# Patient Record
Sex: MALE | Race: WHITE | Employment: OTHER | RURAL
[De-identification: names, ages, dates, MRNs, and addresses within clinical notes are randomized per-mention and may not be internally consistent; named-entity substitution may affect disease eponyms.]

---

## 2017-02-24 ENCOUNTER — OFFICE VISIT (OUTPATIENT)
Dept: PRIMARY CARE CLINIC | Age: 22
End: 2017-02-24
Payer: COMMERCIAL

## 2017-02-24 VITALS
TEMPERATURE: 99.8 F | BODY MASS INDEX: 27.74 KG/M2 | HEART RATE: 96 BPM | RESPIRATION RATE: 20 BRPM | DIASTOLIC BLOOD PRESSURE: 68 MMHG | WEIGHT: 172.6 LBS | SYSTOLIC BLOOD PRESSURE: 116 MMHG | OXYGEN SATURATION: 98 % | HEIGHT: 66 IN

## 2017-02-24 DIAGNOSIS — R50.9 FEVER, UNSPECIFIED FEVER CAUSE: ICD-10-CM

## 2017-02-24 DIAGNOSIS — J10.1 INFLUENZA A: Primary | ICD-10-CM

## 2017-02-24 LAB
INFLUENZA A ANTIBODY: ABNORMAL
INFLUENZA B ANTIBODY: ABNORMAL

## 2017-02-24 PROCEDURE — 87804 INFLUENZA ASSAY W/OPTIC: CPT | Performed by: NURSE PRACTITIONER

## 2017-02-24 PROCEDURE — 99202 OFFICE O/P NEW SF 15 MIN: CPT | Performed by: NURSE PRACTITIONER

## 2017-02-24 RX ORDER — CETIRIZINE HYDROCHLORIDE 10 MG/1
10 TABLET ORAL DAILY
COMMUNITY
End: 2020-12-09

## 2017-02-24 RX ORDER — OSELTAMIVIR PHOSPHATE 75 MG/1
75 CAPSULE ORAL 2 TIMES DAILY
Qty: 10 CAPSULE | Refills: 0 | Status: SHIPPED | OUTPATIENT
Start: 2017-02-24 | End: 2017-03-01

## 2017-02-24 RX ORDER — M-VIT,TX,IRON,MINS/CALC/FOLIC 27MG-0.4MG
1 TABLET ORAL DAILY
COMMUNITY
End: 2018-07-06 | Stop reason: ALTCHOICE

## 2018-07-06 ENCOUNTER — OFFICE VISIT (OUTPATIENT)
Dept: PRIMARY CARE CLINIC | Age: 23
End: 2018-07-06
Payer: COMMERCIAL

## 2018-07-06 VITALS
SYSTOLIC BLOOD PRESSURE: 138 MMHG | OXYGEN SATURATION: 98 % | DIASTOLIC BLOOD PRESSURE: 78 MMHG | RESPIRATION RATE: 16 BRPM | TEMPERATURE: 98.4 F | HEART RATE: 74 BPM | BODY MASS INDEX: 26.42 KG/M2 | WEIGHT: 164.4 LBS | HEIGHT: 66 IN

## 2018-07-06 DIAGNOSIS — R30.0 DYSURIA: Primary | ICD-10-CM

## 2018-07-06 LAB
BILIRUBIN, POC: NORMAL
BLOOD URINE, POC: NORMAL
CLARITY, POC: CLEAR
COLOR, POC: YELLOW
GLUCOSE URINE, POC: NORMAL
KETONES, POC: NORMAL
LEUKOCYTE EST, POC: NORMAL
NITRITE, POC: NORMAL
PH, POC: 5
PROTEIN, POC: NORMAL
SPECIFIC GRAVITY, POC: 1
UROBILINOGEN, POC: 0.2

## 2018-07-06 PROCEDURE — 99213 OFFICE O/P EST LOW 20 MIN: CPT | Performed by: NURSE PRACTITIONER

## 2018-07-06 PROCEDURE — 81002 URINALYSIS NONAUTO W/O SCOPE: CPT | Performed by: NURSE PRACTITIONER

## 2018-07-06 PROCEDURE — 96372 THER/PROPH/DIAG INJ SC/IM: CPT | Performed by: NURSE PRACTITIONER

## 2018-07-06 RX ORDER — CEFTRIAXONE 1 G/1
1 INJECTION, POWDER, FOR SOLUTION INTRAMUSCULAR; INTRAVENOUS ONCE
Status: COMPLETED | OUTPATIENT
Start: 2018-07-06 | End: 2018-07-06

## 2018-07-06 RX ORDER — DOXYCYCLINE HYCLATE 100 MG/1
100 CAPSULE ORAL 2 TIMES DAILY
Qty: 20 CAPSULE | Refills: 0 | Status: SHIPPED | OUTPATIENT
Start: 2018-07-06 | End: 2018-07-16

## 2018-07-06 RX ADMIN — CEFTRIAXONE 1 G: 1 INJECTION, POWDER, FOR SOLUTION INTRAMUSCULAR; INTRAVENOUS at 14:17

## 2018-07-06 ASSESSMENT — ENCOUNTER SYMPTOMS
ANAL BLEEDING: 0
NAUSEA: 0
BLOOD IN STOOL: 0
CONSTIPATION: 0
RESPIRATORY NEGATIVE: 1
EYES NEGATIVE: 1
ABDOMINAL DISTENTION: 0

## 2018-07-06 NOTE — PROGRESS NOTES
Subjective:      Patient ID: James Pena is a 26 y.o. male. Was seeing a girl  Broke up for a awhile  Then got back together . Symptoms began after that . Still having  Dysuria . Was check at [facility] but not treated. Had neg lab work . Dysuria    The current episode started 1 to 4 weeks ago. The problem has been unchanged. The pain is at a severity of 3/10. Pertinent negatives include no chills, flank pain or nausea. Review of Systems   Constitutional: Positive for activity change. Negative for chills and fatigue. HENT: Negative. Eyes: Negative. Respiratory: Negative. Gastrointestinal: Negative for abdominal distention, anal bleeding, blood in stool, constipation and nausea. Genitourinary: Positive for discharge (one time ), dysuria and penile pain. Negative for flank pain. Objective:   Physical Exam   Constitutional: He appears well-developed and well-nourished. Abdominal: There is no tenderness. Genitourinary: Penis normal. No penile tenderness. Musculoskeletal: Normal range of motion. Nursing note and vitals reviewed. Assessment:   Dysuria ,  Possible exposure to STD     Plan:    Rocephin  im  Then doxy for 10 days to follow up if not resolved.

## 2018-09-01 ENCOUNTER — OFFICE VISIT (OUTPATIENT)
Dept: PRIMARY CARE CLINIC | Age: 23
End: 2018-09-01
Payer: COMMERCIAL

## 2018-09-01 VITALS
OXYGEN SATURATION: 99 % | BODY MASS INDEX: 28.16 KG/M2 | SYSTOLIC BLOOD PRESSURE: 124 MMHG | DIASTOLIC BLOOD PRESSURE: 72 MMHG | HEART RATE: 65 BPM | TEMPERATURE: 98.2 F | HEIGHT: 65 IN | WEIGHT: 169 LBS

## 2018-09-01 DIAGNOSIS — J01.80 ACUTE NON-RECURRENT SINUSITIS OF OTHER SINUS: Primary | ICD-10-CM

## 2018-09-01 DIAGNOSIS — H61.23 IMPACTED CERUMEN OF BOTH EARS: ICD-10-CM

## 2018-09-01 PROCEDURE — 99213 OFFICE O/P EST LOW 20 MIN: CPT | Performed by: NURSE PRACTITIONER

## 2018-09-01 PROCEDURE — 69209 REMOVE IMPACTED EAR WAX UNI: CPT | Performed by: NURSE PRACTITIONER

## 2018-09-01 RX ORDER — CEFDINIR 300 MG/1
300 CAPSULE ORAL 2 TIMES DAILY
Qty: 20 CAPSULE | Refills: 0 | Status: SHIPPED | OUTPATIENT
Start: 2018-09-01 | End: 2018-09-11

## 2018-09-01 RX ORDER — METHYLPREDNISOLONE 4 MG/1
4 TABLET ORAL SEE ADMIN INSTRUCTIONS
Qty: 1 KIT | Refills: 0 | Status: SHIPPED | OUTPATIENT
Start: 2018-09-01 | End: 2018-09-07

## 2018-09-01 ASSESSMENT — ENCOUNTER SYMPTOMS
WHEEZING: 1
COUGH: 1

## 2019-05-14 ENCOUNTER — HOSPITAL ENCOUNTER (OUTPATIENT)
Dept: ULTRASOUND IMAGING | Facility: HOSPITAL | Age: 24
Discharge: HOME | End: 2019-05-14
Admitting: NURSE PRACTITIONER

## 2019-05-14 ENCOUNTER — TRANSCRIBE ORDERS (OUTPATIENT)
Dept: ULTRASOUND IMAGING | Facility: HOSPITAL | Age: 24
End: 2019-05-14

## 2019-05-14 DIAGNOSIS — N63.0 UNSPECIFIED LUMP IN UNSPECIFIED BREAST: Primary | ICD-10-CM

## 2019-05-14 DIAGNOSIS — N63.0 UNSPECIFIED LUMP IN UNSPECIFIED BREAST: ICD-10-CM

## 2019-05-14 PROCEDURE — 76642 ULTRASOUND BREAST LIMITED: CPT

## 2019-09-15 ENCOUNTER — HOSPITAL ENCOUNTER (EMERGENCY)
Facility: HOSPITAL | Age: 24
Discharge: HOME | End: 2019-09-15
Attending: EMERGENCY MEDICINE | Admitting: EMERGENCY MEDICINE

## 2019-09-15 VITALS
HEART RATE: 64 BPM | OXYGEN SATURATION: 96 % | TEMPERATURE: 97.6 F | WEIGHT: 180 LBS | SYSTOLIC BLOOD PRESSURE: 125 MMHG | DIASTOLIC BLOOD PRESSURE: 75 MMHG | BODY MASS INDEX: 29.99 KG/M2 | RESPIRATION RATE: 18 BRPM | HEIGHT: 65 IN

## 2019-09-15 DIAGNOSIS — IMO0001 NEEDLESTICK INJURY OF FINGER, INITIAL ENCOUNTER: Primary | ICD-10-CM

## 2019-09-15 LAB
HAV IGM SERPL QL IA: NORMAL
HBV CORE IGM SERPL QL IA: NORMAL
HBV SURFACE AG SERPL QL IA: NORMAL
HCV AB SER DONR QL: NORMAL
HIV1 P24 AG SER QL: NORMAL
HIV1+2 AB SER QL: NORMAL
RPR SER QL: NORMAL

## 2019-09-15 PROCEDURE — 80074 ACUTE HEPATITIS PANEL: CPT | Performed by: EMERGENCY MEDICINE

## 2019-09-15 PROCEDURE — G0432 EIA HIV-1/HIV-2 SCREEN: HCPCS | Performed by: EMERGENCY MEDICINE

## 2019-09-15 PROCEDURE — 87899 AGENT NOS ASSAY W/OPTIC: CPT | Performed by: EMERGENCY MEDICINE

## 2019-09-15 PROCEDURE — 99283 EMERGENCY DEPT VISIT LOW MDM: CPT

## 2019-09-15 PROCEDURE — 86592 SYPHILIS TEST NON-TREP QUAL: CPT | Performed by: EMERGENCY MEDICINE

## 2019-09-15 NOTE — ED PROVIDER NOTES
Subjective   24-year-old male presenting with needlestick.  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 There was ink on the needle and was told by the inmate that the needle had been used. Unsure of who the needle was used on.  He has no complaints.  He has no medical history.  His immunizations are up-to-date.            Review of Systems   Constitutional: Negative.    HENT: Negative.    Eyes: Negative.    Respiratory: Negative.    Cardiovascular: Negative.    Gastrointestinal: Negative.    Genitourinary: Negative.    Musculoskeletal: Negative.    Skin: Positive for wound.   Neurological: Negative.    Psychiatric/Behavioral: Negative.        History reviewed. No pertinent past medical history.    No Known Allergies    History reviewed. No pertinent surgical history.    History reviewed. No pertinent family history.    Social History     Socioeconomic History   • Marital status: Single     Spouse name: Not on file   • Number of children: Not on file   • Years of education: Not on file   • Highest education level: Not on file   Substance and Sexual Activity   • Alcohol use: No     Frequency: Never   • Drug use: No           Objective   Physical Exam   Constitutional: He is oriented to person, place, and time. He appears well-developed and well-nourished. No distress.   HENT:   Head: Normocephalic and atraumatic.   Right Ear: External ear normal.   Left Ear: External ear normal.   Nose: Nose normal.   Mouth/Throat: Oropharynx is clear and moist.   Eyes: Conjunctivae and EOM are normal. Pupils are equal, round, and reactive to light.   Neck: Normal range of motion. Neck supple.   Cardiovascular: Normal rate, regular rhythm, normal heart sounds and intact distal pulses.   Pulmonary/Chest: Effort normal and breath sounds normal. No respiratory distress.   Abdominal: Soft. Bowel sounds are  normal. He exhibits no distension. There is no tenderness. There is no rebound and no guarding.   Musculoskeletal: Normal range of motion. He exhibits no edema, tenderness or deformity.   Neurological: He is alert and oriented to person, place, and time.   Skin: Skin is warm and dry. No rash noted.   Tiny puncture wound to left volar index finger   Psychiatric: He has a normal mood and affect. His behavior is normal.   Nursing note and vitals reviewed.      Procedures           ED Course              MDM  Number of Diagnoses or Management Options  Needlestick injury of finger, initial encounter:   Diagnosis management comments: 24 year old male with needle stick. Well developed, well nourished young man in no distress with exam as above. I think this is likely a very low risk injury for transmission of any infectious disease. We did discuss post exposure prophylaxis and will defer at this time. Will draw labs and have him follow up with occupational health.     DDX: needle stick       Amount and/or Complexity of Data Reviewed  Clinical lab tests: reviewed      Final diagnoses:   Needlestick injury of finger, initial encounter              Andrew Dunne MD  09/15/19 9104

## 2019-09-15 NOTE — DISCHARGE INSTRUCTIONS
Cleanse wound with warm soapy water, can use antibacterial ointment twice daily for 4-5 days. Monitor for signs of infection. Follow up with occupational health for further work up and evaluation

## 2020-02-28 ENCOUNTER — HOSPITAL ENCOUNTER (EMERGENCY)
Facility: HOSPITAL | Age: 25
Discharge: HOME | End: 2020-02-28
Attending: EMERGENCY MEDICINE | Admitting: EMERGENCY MEDICINE

## 2020-02-28 VITALS
RESPIRATION RATE: 20 BRPM | HEART RATE: 78 BPM | TEMPERATURE: 98.4 F | BODY MASS INDEX: 34.05 KG/M2 | OXYGEN SATURATION: 96 % | SYSTOLIC BLOOD PRESSURE: 138 MMHG | WEIGHT: 204.4 LBS | DIASTOLIC BLOOD PRESSURE: 92 MMHG | HEIGHT: 65 IN

## 2020-02-28 DIAGNOSIS — F10.29 ALCOHOL DEPENDENCE WITH UNSPECIFIED ALCOHOL-INDUCED DISORDER (HCC): Primary | ICD-10-CM

## 2020-02-28 PROCEDURE — 99283 EMERGENCY DEPT VISIT LOW MDM: CPT

## 2020-02-28 RX ORDER — ONDANSETRON 4 MG/1
4 TABLET, ORALLY DISINTEGRATING ORAL EVERY 8 HOURS PRN
Qty: 12 TABLET | Refills: 0 | Status: SHIPPED | OUTPATIENT
Start: 2020-02-28 | End: 2020-09-15

## 2020-02-28 RX ORDER — SUCRALFATE 1 G/1
1 TABLET ORAL 4 TIMES DAILY
Qty: 40 TABLET | Refills: 0 | Status: SHIPPED | OUTPATIENT
Start: 2020-02-28 | End: 2020-09-15

## 2020-02-28 RX ORDER — PANTOPRAZOLE SODIUM 20 MG/1
20 TABLET, DELAYED RELEASE ORAL DAILY
Qty: 30 TABLET | Refills: 0 | Status: SHIPPED | OUTPATIENT
Start: 2020-02-28 | End: 2020-09-15

## 2020-09-15 ENCOUNTER — OFFICE VISIT (OUTPATIENT)
Dept: NEUROLOGY | Facility: CLINIC | Age: 25
End: 2020-09-15

## 2020-09-15 VITALS
HEIGHT: 65 IN | TEMPERATURE: 97.8 F | WEIGHT: 201 LBS | BODY MASS INDEX: 33.49 KG/M2 | SYSTOLIC BLOOD PRESSURE: 122 MMHG | DIASTOLIC BLOOD PRESSURE: 80 MMHG | HEART RATE: 116 BPM | OXYGEN SATURATION: 98 %

## 2020-09-15 DIAGNOSIS — R06.83 SNORING: Primary | ICD-10-CM

## 2020-09-15 PROCEDURE — 99203 OFFICE O/P NEW LOW 30 MIN: CPT | Performed by: NURSE PRACTITIONER

## 2020-09-15 NOTE — PROGRESS NOTES
New Sleep Patient Office Visit      Patient Name: Tristan Batista  : 1995   MRN: [id number]     Referring Physician: No ref. provider found    Chief Complaint:    Chief Complaint   Patient presents with   • Consult     Patient in office to establish care of primitivo.patient c/o of snoring,gasping for air.         History of Present Illness: Tristan Batista is a 25 y.o. male who is here today to establish care with Sleep Medicine.  Sleep questionnaire reviewed.  He says he is able to fall asleep immediately, he wakes up 2-3 times during the night, he has feelings of not being able to breathe well during the night, he sleeps an average of 5 to 7 hours per night, experiences disturbed her restless sleep, he works night shift, he snores, he has awakened at night gasping for breath, he has been told he stops breathing when sleeping, he experiences daytime sleepiness.  Additional risk factors-BMI 33.    Stinnett Score: 10    Subjective      Review of Systems:   Review of Systems   Constitutional: Positive for fatigue. Negative for chills, fever, unexpected weight gain and unexpected weight loss.   HENT: Negative for hearing loss, sore throat, swollen glands, tinnitus and trouble swallowing.    Eyes: Negative for blurred vision, double vision, photophobia and visual disturbance.   Respiratory: Positive for apnea. Negative for cough, chest tightness, shortness of breath, wheezing and stridor.    Cardiovascular: Negative for chest pain, palpitations and leg swelling.   Gastrointestinal: Negative for abdominal pain, constipation, diarrhea, nausea and GERD.   Endocrine: Negative for cold intolerance and heat intolerance.   Genitourinary: Negative for decreased libido.   Musculoskeletal: Negative for gait problem, neck pain and neck stiffness.   Skin: Negative for color change and rash.   Allergic/Immunologic: Negative for environmental allergies and food allergies.   Neurological: Negative for dizziness, syncope, facial  "asymmetry, speech difficulty, weakness, light-headedness, headache, memory problem and confusion.   Psychiatric/Behavioral: Negative for agitation, behavioral problems, decreased concentration, sleep disturbance and depressed mood. The patient is not nervous/anxious.        Past Medical History:   Past Medical History:   Diagnosis Date   • Hypertension        Past Surgical History:   Past Surgical History:   Procedure Laterality Date   • KNEE SURGERY Left        Family History:   Family History   Problem Relation Age of Onset   • Dementia Maternal Grandfather        Social History:   Social History     Socioeconomic History   • Marital status: Single     Spouse name: Not on file   • Number of children: Not on file   • Years of education: Not on file   • Highest education level: Not on file   Tobacco Use   • Smoking status: Never Smoker   • Smokeless tobacco: Current User     Types: Chew   Substance and Sexual Activity   • Alcohol use: Yes     Frequency: Never     Comment: 1/2 5th to 5th daily x 7 mos   • Drug use: No   • Sexual activity: Defer       Medications:   No current outpatient medications on file.    Allergies:   No Known Allergies    Objective     Physical Exam:  Vital Signs:   Vitals:    09/15/20 1120   BP: 122/80   Pulse: 116   Temp: 97.8 °F (36.6 °C)   SpO2: 98%   Weight: 91.2 kg (201 lb)   Height: 165.1 cm (65\")   PainSc: 0-No pain     BMI: Body mass index is 33.45 kg/m².  Neck Circumference: 14 1/4    Physical Exam  Vitals signs and nursing note reviewed.   Constitutional:       General: He is not in acute distress.     Appearance: He is well-developed. He is not diaphoretic.   HENT:      Head: Normocephalic and atraumatic.      Comments: Mallampati 3  Eyes:      Conjunctiva/sclera: Conjunctivae normal.      Pupils: Pupils are equal, round, and reactive to light.   Neck:      Musculoskeletal: Neck supple.      Thyroid: No thyroid mass or thyromegaly.      Vascular: Normal carotid pulses.      Trachea: " Trachea normal.   Cardiovascular:      Rate and Rhythm: Normal rate and regular rhythm.      Heart sounds: Normal heart sounds. No murmur. No friction rub. No gallop.    Pulmonary:      Effort: Pulmonary effort is normal. No respiratory distress.      Breath sounds: Normal breath sounds. No wheezing or rales.   Musculoskeletal: Normal range of motion.   Skin:     General: Skin is warm and dry.      Findings: No rash.   Neurological:      Mental Status: He is alert and oriented to person, place, and time.   Psychiatric:         Behavior: Behavior normal.         Thought Content: Thought content normal.         Assessment / Plan      Assessment/Plan:   Tristan was seen today for consult.    Diagnoses and all orders for this visit:    Snoring  -     Polysomnography 4 or More Parameters; Future. If insurance does not approve, can do a home sleep study.   - Printed patient education on SABI and Shift work disorder provided today.   - Advised patient to avoid driving if drowsy.     BMI 33.0-33.9,adult  -     Polysomnography 4 or More Parameters; Future  - The patient was counseled on goals and the need for weight reduction. They were directed to the NIH's website on weight management, (https://www.niddk.nih.gov/health-information/weight-management/health-tips-adults) which addresses the risks of being overweight or obese, a healthy diet, tips for losing weight, and the benefit of physical activity/exercise.         Follow Up:   Return in about 3 months (around 12/15/2020) for F/U Obstructive Sleep Apnea.    I have advised the patient the need to continue the use of CPAP.  Gold standard for treatment of sleep apnea includes weight loss, use of cpap and avoidance of alcohol.  Untreated SABI may increase the risk for development of hypertension, stroke, myocardial infarction, diabetes, cardiovascular disease, work-related issues and driving accidents. I have counseled and advised the patient to avoid driving or operating  heavy/dangerous equipment if feeling drowsy.     IVIS Barajas, FNP-C  [facility] Neurology and Sleep Medicine       Please note that portions of this note may have been completed with a voice recognition program. Efforts were made to edit the dictations, but occasionally words are mistranscribed.

## 2020-11-20 ENCOUNTER — OFFICE VISIT (OUTPATIENT)
Dept: PRIMARY CARE CLINIC | Age: 25
End: 2020-11-20
Payer: COMMERCIAL

## 2020-11-20 VITALS — OXYGEN SATURATION: 98 % | TEMPERATURE: 97.3 F | RESPIRATION RATE: 18 BRPM | HEART RATE: 69 BPM

## 2020-11-20 PROCEDURE — 99213 OFFICE O/P EST LOW 20 MIN: CPT | Performed by: NURSE PRACTITIONER

## 2020-11-20 RX ORDER — PREDNISONE 10 MG/1
10 TABLET ORAL 2 TIMES DAILY
Qty: 10 TABLET | Refills: 0 | Status: SHIPPED | OUTPATIENT
Start: 2020-11-20 | End: 2020-11-25

## 2020-11-20 RX ORDER — AMOXICILLIN 500 MG/1
500 CAPSULE ORAL 2 TIMES DAILY WITH MEALS
Qty: 20 CAPSULE | Refills: 0 | Status: SHIPPED | OUTPATIENT
Start: 2020-11-20 | End: 2020-11-30

## 2020-11-20 NOTE — PROGRESS NOTES
Pt co bilateral ear pain mostly the right one bothering him for about 2-3 weeks. Pt also co tonsil stones.

## 2020-11-20 NOTE — PROGRESS NOTES
SUBJECTIVE:    Patient ID: Mahogany Hudson is a 25 y.o.male. Chief Complaint   Patient presents with   Allegra Nearing    Other     tonsil stones         HPI:    Patient presents to clinic with complaints of right ear pain for several weeks. Associated symptoms include ear pressure, slight decreased hearing right ear, slight sore throat, few tonsil stones. Hx of ear wax impaction per patient and seasonal/environmental allergies. Denies covid exposure, fevers, chills, body aches, CP, palpitations, SOB. No relieving or worsening factors reported. No treatment regimens reported. Patient's medications, allergies, past medical, surgical, social and family histories were reviewed and updated as appropriate in electronic medical record. No outpatient medications have been marked as taking for the 11/20/20 encounter (Office Visit) with SADIE Mcclure CNP. Review of Systems   Constitutional: Negative. HENT: Positive for congestion, ear pain and sore throat. Negative for ear discharge and trouble swallowing. Eyes: Negative. Respiratory: Negative. Cardiovascular: Negative. Musculoskeletal: Negative. Allergic/Immunologic: Positive for environmental allergies. Neurological: Negative. Past Medical History:   Diagnosis Date    Allergic rhinitis      Past Surgical History:   Procedure Laterality Date    KNEE ARTHROSCOPY       Family History   Problem Relation Age of Onset    Sleep Apnea Mother     Sleep Apnea Father       Social History     Tobacco Use   Smoking Status Never Smoker   Smokeless Tobacco Current User    Types: Snuff       OBJECTIVE:   Wt Readings from Last 3 Encounters:   09/01/18 169 lb (76.7 kg)   07/06/18 164 lb 6.4 oz (74.6 kg)   02/24/17 172 lb 9.6 oz (78.3 kg) (73 %, Z= 0.62)*     * Growth percentiles are based on CDC (Boys, 2-20 Years) data.      BP Readings from Last 3 Encounters:   09/01/18 124/72   07/06/18 138/78   02/24/17 116/68       Pulse 69 above. Advised sweet oil drops 1-2 drops weekly PRN. - amoxicillin (AMOXIL) 500 MG capsule; Take 1 capsule by mouth 2 times daily (with meals) for 10 days  Dispense: 20 capsule; Refill: 0  - predniSONE (DELTASONE) 10 MG tablet; Take 1 tablet by mouth 2 times daily for 5 days  Dispense: 10 tablet;  Refill: 0        Orders Placed This Encounter   Medications    amoxicillin (AMOXIL) 500 MG capsule     Sig: Take 1 capsule by mouth 2 times daily (with meals) for 10 days     Dispense:  20 capsule     Refill:  0    predniSONE (DELTASONE) 10 MG tablet     Sig: Take 1 tablet by mouth 2 times daily for 5 days     Dispense:  10 tablet     Refill:  0

## 2020-12-02 ENCOUNTER — HOSPITAL ENCOUNTER (OUTPATIENT)
Dept: SLEEP MEDICINE | Facility: HOSPITAL | Age: 25
Discharge: HOME | End: 2020-12-02
Admitting: NURSE PRACTITIONER

## 2020-12-02 DIAGNOSIS — R06.83 SNORING: ICD-10-CM

## 2020-12-02 PROCEDURE — 95810 POLYSOM 6/> YRS 4/> PARAM: CPT | Performed by: INTERNAL MEDICINE

## 2020-12-02 PROCEDURE — 95810 POLYSOM 6/> YRS 4/> PARAM: CPT

## 2020-12-07 ASSESSMENT — ENCOUNTER SYMPTOMS
TROUBLE SWALLOWING: 0
RESPIRATORY NEGATIVE: 1
SORE THROAT: 1
EYES NEGATIVE: 1

## 2020-12-09 ENCOUNTER — OFFICE VISIT (OUTPATIENT)
Dept: PRIMARY CARE CLINIC | Age: 25
End: 2020-12-09
Payer: COMMERCIAL

## 2020-12-09 VITALS
RESPIRATION RATE: 16 BRPM | SYSTOLIC BLOOD PRESSURE: 116 MMHG | TEMPERATURE: 97.1 F | HEART RATE: 86 BPM | HEIGHT: 60 IN | BODY MASS INDEX: 38.28 KG/M2 | DIASTOLIC BLOOD PRESSURE: 80 MMHG | OXYGEN SATURATION: 98 % | WEIGHT: 195 LBS

## 2020-12-09 PROCEDURE — 99214 OFFICE O/P EST MOD 30 MIN: CPT | Performed by: NURSE PRACTITIONER

## 2020-12-09 SDOH — HEALTH STABILITY: MENTAL HEALTH: HOW OFTEN DO YOU HAVE A DRINK CONTAINING ALCOHOL?: 2-3 TIMES A WEEK

## 2020-12-09 ASSESSMENT — ENCOUNTER SYMPTOMS
CONSTIPATION: 0
ABDOMINAL PAIN: 0
VOMITING: 0
NAUSEA: 0
EYE ITCHING: 0
SORE THROAT: 0
SHORTNESS OF BREATH: 0
EYE DISCHARGE: 0
RHINORRHEA: 0
COUGH: 0
DIARRHEA: 0
EYE REDNESS: 0

## 2020-12-09 ASSESSMENT — PATIENT HEALTH QUESTIONNAIRE - PHQ9
SUM OF ALL RESPONSES TO PHQ QUESTIONS 1-9: 0
SUM OF ALL RESPONSES TO PHQ QUESTIONS 1-9: 0
1. LITTLE INTEREST OR PLEASURE IN DOING THINGS: 0
2. FEELING DOWN, DEPRESSED OR HOPELESS: 0
SUM OF ALL RESPONSES TO PHQ QUESTIONS 1-9: 0
SUM OF ALL RESPONSES TO PHQ9 QUESTIONS 1 & 2: 0

## 2020-12-09 NOTE — PATIENT INSTRUCTIONS
the week to relieve stress. Walking is a good choice. You also may want to do other activities, such as running, swimming, cycling, or playing tennis or team sports. · Do not:  ? Hold a phone between your shoulder and your jaw. ? Open your mouth all the way, like when you sing loudly or yawn. ? Clench or grind your teeth, bite your lips, or chew your fingernails. ? Clench things such as pens, pipes, or cigars between your teeth. When should you call for help? Call your doctor now or seek immediate medical care if:    · Your jaw is locked open or shut or it is hard to move your jaw. Watch closely for changes in your health, and be sure to contact your doctor if:    · Your jaw pain gets worse.     · Your face is swollen.     · You do not get better as expected. Where can you learn more? Go to https://[email or web address] Ghost [email or web address] org and sign in to your [employer] account. Enter V787 in the GLOG box to learn more about \"Temporomandibular Disorder: Care Instructions. \"     If you do not have an account, please click on the \"Sign Up Now\" link. Current as of: March 25, 2020               Content Version: 12.6  © 2821-6862 [employer]. Care instructions adapted under license by [facility] ([facility]). If you have questions about a medical condition or this instruction, always ask your healthcare professional. [place] [address] any warranty or liability for your use of this information.              Mention BuSpar and Lexapro to psychiatry to see if they feel like they may be beneficial.

## 2020-12-09 NOTE — PROGRESS NOTES
Have you seen any other physician or provider since your last visit? Yes, psych    Have you had any other diagnostic tests since your last visit? No    Have you changed or stopped any medications since your last visit? No    SUBJECTIVE:    Patient ID: Yany Agee is a 23 y.o. male. Medical History Review  Past Medical, Family, and Social History reviewed and does contribute to the patient presenting condition    Health Maintenance Due   Topic Date Due    Hepatitis C screen  1995    HPV vaccine (1 - Male 2-dose series) 03/29/2008    HIV screen  03/29/2012    DTaP/Tdap/Td vaccine (1 - Tdap) 03/29/2016    Varicella vaccine (2 of 2 - 13+ 2-dose series) 01/02/2018    Flu vaccine (1) 09/01/2020       HPI:   Chief Complaint   Patient presents with   [address]     Pt is here today to [facility]. Pt has no complaints. He just had a sleep study. He snores and stays tired a lot. He had labs a few months ago but was not told he had any deficiencies. He has some issues with TMJ disorder. He was started on Fluvoxamine, but stopped it abruptly due to side effects. He sees [employer]. He has an appointment on 12/15. He has a long history of anxiety and depression. Patient's medications, allergies, past medical, surgical, social and family histories were reviewed and updated as appropriate. Review of Systems   Constitutional: Negative for chills, fatigue and fever. HENT: Negative for congestion, ear pain, rhinorrhea and sore throat. Eyes: Negative for discharge, redness and itching. Respiratory: Negative for cough and shortness of breath. Cardiovascular: Negative for chest pain, palpitations and leg swelling. Gastrointestinal: Negative for abdominal pain, constipation, diarrhea, nausea and vomiting. Endocrine: Negative for cold intolerance and heat intolerance. Genitourinary: Negative for dysuria. Musculoskeletal: Negative for arthralgias and joint swelling. Skin: Negative for rash and wound. Neurological: Negative for weakness and headaches. Hematological: Negative for adenopathy. Psychiatric/Behavioral: Negative for dysphoric mood and sleep disturbance. The patient is not nervous/anxious. Reviewed and acurate. See MA note. OBJECTIVE:  /80 (Site: Right Upper Arm, Position: Sitting)   Pulse 86   Temp 97.1 °F (36.2 °C) (Temporal)   Resp 16   Ht 5' (1.524 m)   Wt 195 lb (88.5 kg)   SpO2 98% Comment: room air  BMI 38.08 kg/m²    Physical Exam  Constitutional:       Appearance: He is well-developed. HENT:      Head: Normocephalic and atraumatic. Right Ear: External ear normal.      Left Ear: External ear normal.   Eyes:      Conjunctiva/sclera: Conjunctivae normal.      Pupils: Pupils are equal, round, and reactive to light. Neck:      Musculoskeletal: Neck supple. Thyroid: No thyromegaly. Vascular: No JVD. Cardiovascular:      Rate and Rhythm: Normal rate and regular rhythm. Heart sounds: Normal heart sounds. No murmur. No friction rub. No gallop. Pulmonary:      Effort: Pulmonary effort is normal. No respiratory distress. Breath sounds: Normal breath sounds. Abdominal:      General: Bowel sounds are normal. There is no distension. Palpations: Abdomen is soft. Tenderness: There is no abdominal tenderness. Musculoskeletal: Normal range of motion. Lymphadenopathy:      Cervical: No cervical adenopathy. Skin:     General: Skin is warm and dry. Neurological:      Mental Status: He is alert and oriented to person, place, and time. Cranial Nerves: No cranial nerve deficit. No results found for requested labs within last 30 days. No results found for: LABA1C, LABMICR, LDLCALC    No results found for: WBC, NEUTROABS, HGB, HCT, MCV, PLT  No results found for: TSH    Prior to Visit Medications    Medication Sig Taking?  Authorizing Provider   PREDNISONE PO Take by mouth Yes Historical Provider, MD   AMOXICILLIN PO Take by mouth Yes Historical Provider, MD       ASSESSMENT:  1. Fatigue, unspecified type    2. Encounter for lipid screening for cardiovascular disease        PLAN:    Orders Placed This Encounter   Procedures    TSH without Reflex    COMPREHENSIVE METABOLIC PANEL    CBC WITH AUTO DIFFERENTIAL    VITAMIN B1    VITAMIN B12 & FOLATE    VITAMIN D 25 HYDROXY    LIPID PANEL     Patient Instructions       Patient Education        Temporomandibular Disorder: Care Instructions  Your Care Instructions     Temporomandibular (TM) disorders are a problem with the muscles and joints that connect your jaw to your skull. They cause pain when you open your mouth, chew, or yawn. You may feel this pain on one or both sides. TM disorders are often caused by tight jaw muscles. The tightness can be caused by clenching or grinding your teeth. This may happen when you have a lot of stress in your life. If you lower your stress, you may be able to stop clenching or grinding your teeth. This will help relax your jaw and reduce your pain. You may also be able to do some things at home to feel better. But if none of this works, your doctor may prescribe medicine to help relax your muscles and control the pain. Follow-up care is a key part of your treatment and safety. Be sure to make and go to all appointments, and call your doctor if you are having problems. It's also a good idea to know your test results and keep a list of the medicines you take. How can you care for yourself at home? · Put a warm, moist cloth or heating pad set on low on your jaw. Do this for 10 to 20 minutes at a time. Put a thin cloth between the heating pad and your skin. · Avoid hard or chewy foods that cause your jaws to work very hard. Examples include popcorn, jerky, tough meats, chewy breads, gum, and raw apples and carrots. · Choose softer foods that are easy to chew. These include eggs, yogurt, and soup.   · Cut your food into small pieces. Chew slowly. · If your jaw gets too painful to chew, or if it locks, you may need to puree your food for a few days or weeks. · To relax your jaw, repeat this exercise for a few minutes every morning and evening. Watch yourself in a mirror. Gently open and close your mouth. Move your jaw straight up and down. But don't do this if it makes your pain worse. · Get at least 30 minutes of exercise on most days of the week to relieve stress. Walking is a good choice. You also may want to do other activities, such as running, swimming, cycling, or playing tennis or team sports. · Do not:  ? Hold a phone between your shoulder and your jaw. ? Open your mouth all the way, like when you sing loudly or yawn. ? Clench or grind your teeth, bite your lips, or chew your fingernails. ? Clench things such as pens, pipes, or cigars between your teeth. When should you call for help? Call your doctor now or seek immediate medical care if:    · Your jaw is locked open or shut or it is hard to move your jaw. Watch closely for changes in your health, and be sure to contact your doctor if:    · Your jaw pain gets worse.     · Your face is swollen.     · You do not get better as expected. Where can you learn more? Go to https://White Rock Networkspezariaeweb.ImpactRx. org and sign in to your [employer] account. Enter V736 in the ZoomForth box to learn more about \"Temporomandibular Disorder: Care Instructions. \"     If you do not have an account, please click on the \"Sign Up Now\" link. Current as of: March 25, 2020               Content Version: 12.6  © 4502-0265 [employer]. Care instructions adapted under license by [facility] ([facility]). If you have questions about a medical condition or this instruction, always ask your healthcare professional. [address] any warranty or liability for your use of this information.              Mention BuSpar and Lexapro to psychiatry to see if they feel like they may be beneficial.           I, Faisal Rose CMA am scribing for and in the presence of SADIE Cruz on 12/30/2020 at 11:12 PM.      I, Anay NOEL, personally performed the services described in the documentation as scribed by Faisal Rose CMA, in my presence and it is both accurate and complete.

## 2020-12-15 ENCOUNTER — OFFICE VISIT (OUTPATIENT)
Dept: NEUROLOGY | Facility: CLINIC | Age: 25
End: 2020-12-15

## 2020-12-15 DIAGNOSIS — R06.83 SNORING: Primary | ICD-10-CM

## 2020-12-15 PROCEDURE — 99442 PR PHYS/QHP TELEPHONE EVALUATION 11-20 MIN: CPT | Performed by: NURSE PRACTITIONER

## 2020-12-15 NOTE — PROGRESS NOTES
New Sleep Patient Office Visit      Patient Name: Tristan Batista  : 1995   MRN: [id number]     Referring Physician: No ref. provider found    Chief Complaint:    Chief Complaint   Patient presents with   • Snoring     follow up       History of Present Illness: Tristan Batista is a 25 y.o. male who presents for a telephone visit today.  He had a sleep study on     Subjective      Review of Systems:   Review of Systems    Past Medical History:   Past Medical History:   Diagnosis Date   • Hypertension        Past Surgical History:   Past Surgical History:   Procedure Laterality Date   • KNEE SURGERY Left        Family History:   Family History   Problem Relation Age of Onset   • Dementia Maternal Grandfather        Social History:   Social History     Socioeconomic History   • Marital status: Single     Spouse name: Not on file   • Number of children: Not on file   • Years of education: Not on file   • Highest education level: Not on file   Tobacco Use   • Smoking status: Never Smoker   • Smokeless tobacco: Current User     Types: Chew   Substance and Sexual Activity   • Alcohol use: Yes     Frequency: Never     Comment: 1/2 5th to 5th daily x 7 mos   • Drug use: No   • Sexual activity: Defer       Medications:   No current outpatient medications on file.    Allergies:   No Known Allergies    Objective     Physical Exam:  Vital Signs: There were no vitals filed for this visit.  BMI: There is no height or weight on file to calculate BMI.      Physical Exam    Assessment / Plan      Assessment/Plan:   Diagnoses and all orders for this visit:    1. Snoring (Primary)    2. BMI 33.0-33.9,adult         Follow Up:   No follow-ups on file.     *This visit was performed via telephone during the COVID-19 pandemic and lasted for 11 minutes.     I have advised the patient the need to continue the use of CPAP.  Gold standard for treatment of sleep apnea includes weight loss, use of cpap and avoidance of alcohol.  Untreated  SABI may increase the risk for development of hypertension, stroke, myocardial infarction, diabetes, cardiovascular disease, work-related issues and driving accidents. I have counseled and advised the patient to avoid driving or operating heavy/dangerous equipment if feeling drowsy.     IVIS Barajas, FNP-C  [facility]       Please note that portions of this note may have been completed with a voice recognition program. Efforts were made to edit the dictations, but occasionally words are mistranscribed.

## 2021-01-14 ENCOUNTER — TELEPHONE (OUTPATIENT)
Dept: PRIMARY CARE CLINIC | Age: 26
End: 2021-01-14

## 2021-01-15 ENCOUNTER — HOSPITAL ENCOUNTER (OUTPATIENT)
Facility: HOSPITAL | Age: 26
Discharge: HOME | End: 2021-01-15
Payer: COMMERCIAL

## 2021-01-15 ENCOUNTER — OFFICE VISIT (OUTPATIENT)
Dept: PRIMARY CARE CLINIC | Age: 26
End: 2021-01-15
Payer: COMMERCIAL

## 2021-01-15 VITALS
HEART RATE: 89 BPM | DIASTOLIC BLOOD PRESSURE: 88 MMHG | TEMPERATURE: 97.4 F | HEIGHT: 66 IN | SYSTOLIC BLOOD PRESSURE: 144 MMHG | BODY MASS INDEX: 32.14 KG/M2 | WEIGHT: 200 LBS

## 2021-01-15 DIAGNOSIS — Z11.3 SCREEN FOR STD (SEXUALLY TRANSMITTED DISEASE): Primary | ICD-10-CM

## 2021-01-15 DIAGNOSIS — Z11.3 SCREEN FOR STD (SEXUALLY TRANSMITTED DISEASE): ICD-10-CM

## 2021-01-15 DIAGNOSIS — Z13.220 ENCOUNTER FOR LIPID SCREENING FOR CARDIOVASCULAR DISEASE: ICD-10-CM

## 2021-01-15 DIAGNOSIS — N39.0 URINARY TRACT INFECTION WITHOUT HEMATURIA, SITE UNSPECIFIED: ICD-10-CM

## 2021-01-15 DIAGNOSIS — R53.83 FATIGUE, UNSPECIFIED TYPE: ICD-10-CM

## 2021-01-15 DIAGNOSIS — Z13.6 ENCOUNTER FOR LIPID SCREENING FOR CARDIOVASCULAR DISEASE: ICD-10-CM

## 2021-01-15 LAB
A/G RATIO: 2.1 (ref 0.8–2)
ALBUMIN SERPL-MCNC: 5.1 G/DL (ref 3.4–4.8)
ALP BLD-CCNC: 77 U/L (ref 25–100)
ALT SERPL-CCNC: 26 U/L (ref 4–36)
ANION GAP SERPL CALCULATED.3IONS-SCNC: 11 MMOL/L (ref 3–16)
APPEARANCE FLUID: ABNORMAL
AST SERPL-CCNC: 25 U/L (ref 8–33)
BASOPHILS ABSOLUTE: 0.1 K/UL (ref 0–0.1)
BASOPHILS RELATIVE PERCENT: 1.2 %
BILIRUB SERPL-MCNC: 0.7 MG/DL (ref 0.3–1.2)
BILIRUBIN, POC: ABNORMAL
BLOOD URINE, POC: ABNORMAL
BUN BLDV-MCNC: 9 MG/DL (ref 6–20)
CALCIUM SERPL-MCNC: 10.4 MG/DL (ref 8.5–10.5)
CHLORIDE BLD-SCNC: 100 MMOL/L (ref 98–107)
CHOLESTEROL, TOTAL: 194 MG/DL (ref 0–200)
CLARITY, POC: ABNORMAL
CO2: 28 MMOL/L (ref 20–30)
COLOR, POC: YELLOW
CREAT SERPL-MCNC: 0.9 MG/DL (ref 0.4–1.2)
EOSINOPHILS ABSOLUTE: 0.7 K/UL (ref 0–0.4)
EOSINOPHILS RELATIVE PERCENT: 12.7 %
FOLATE: 10.56 NG/ML
GFR AFRICAN AMERICAN: >59
GFR NON-AFRICAN AMERICAN: >59
GLOBULIN: 2.4 G/DL
GLUCOSE BLD-MCNC: 89 MG/DL (ref 74–106)
GLUCOSE URINE, POC: ABNORMAL
HCT VFR BLD CALC: 50.2 % (ref 40–54)
HDLC SERPL-MCNC: 45 MG/DL (ref 40–60)
HEMOGLOBIN: 16.3 G/DL (ref 13–18)
IMMATURE GRANULOCYTES #: 0 K/UL
IMMATURE GRANULOCYTES %: 0.4 % (ref 0–5)
KETONES, POC: ABNORMAL
LDL CHOLESTEROL CALCULATED: 110 MG/DL
LEUKOCYTE EST, POC: ABNORMAL
LYMPHOCYTES ABSOLUTE: 1.9 K/UL (ref 1.5–4)
LYMPHOCYTES RELATIVE PERCENT: 34 %
MCH RBC QN AUTO: 30 PG (ref 27–32)
MCHC RBC AUTO-ENTMCNC: 32.5 G/DL (ref 31–35)
MCV RBC AUTO: 92.3 FL (ref 80–100)
MONOCYTES ABSOLUTE: 0.7 K/UL (ref 0.2–0.8)
MONOCYTES RELATIVE PERCENT: 12.7 %
NEUTROPHILS ABSOLUTE: 2.2 K/UL (ref 2–7.5)
NEUTROPHILS RELATIVE PERCENT: 39 %
NITRITE, POC: ABNORMAL
PDW BLD-RTO: 12.8 % (ref 11–16)
PH, POC: 8.5
PLATELET # BLD: 301 K/UL (ref 150–400)
PMV BLD AUTO: 11.1 FL (ref 6–10)
POTASSIUM SERPL-SCNC: 4.4 MMOL/L (ref 3.4–5.1)
PROTEIN, POC: ABNORMAL
RBC # BLD: 5.44 M/UL (ref 4.5–6)
SODIUM BLD-SCNC: 139 MMOL/L (ref 136–145)
SPECIFIC GRAVITY, POC: 1.01
TOTAL PROTEIN: 7.5 G/DL (ref 6.4–8.3)
TRIGL SERPL-MCNC: 195 MG/DL (ref 0–249)
TSH SERPL DL<=0.05 MIU/L-ACNC: 1.75 UIU/ML (ref 0.27–4.2)
UROBILINOGEN, POC: 0.2
VITAMIN B-12: 499 PG/ML (ref 211–911)
VITAMIN D 25-HYDROXY: 18.6 (ref 32–100)
VLDLC SERPL CALC-MCNC: 39 MG/DL
WBC # BLD: 5.7 K/UL (ref 4–11)

## 2021-01-15 PROCEDURE — 87591 N.GONORRHOEAE DNA AMP PROB: CPT

## 2021-01-15 PROCEDURE — 99212 OFFICE O/P EST SF 10 MIN: CPT | Performed by: NURSE PRACTITIONER

## 2021-01-15 PROCEDURE — 82746 ASSAY OF FOLIC ACID SERUM: CPT

## 2021-01-15 PROCEDURE — 84425 ASSAY OF VITAMIN B-1: CPT

## 2021-01-15 PROCEDURE — 81002 URINALYSIS NONAUTO W/O SCOPE: CPT | Performed by: NURSE PRACTITIONER

## 2021-01-15 PROCEDURE — 36415 COLL VENOUS BLD VENIPUNCTURE: CPT

## 2021-01-15 PROCEDURE — 85025 COMPLETE CBC W/AUTO DIFF WBC: CPT

## 2021-01-15 PROCEDURE — 80053 COMPREHEN METABOLIC PANEL: CPT

## 2021-01-15 PROCEDURE — 82306 VITAMIN D 25 HYDROXY: CPT

## 2021-01-15 PROCEDURE — 82607 VITAMIN B-12: CPT

## 2021-01-15 PROCEDURE — 80061 LIPID PANEL: CPT

## 2021-01-15 PROCEDURE — 87491 CHLMYD TRACH DNA AMP PROBE: CPT

## 2021-01-15 PROCEDURE — 84443 ASSAY THYROID STIM HORMONE: CPT

## 2021-01-15 RX ORDER — PHENAZOPYRIDINE HYDROCHLORIDE 100 MG/1
100 TABLET, FILM COATED ORAL 3 TIMES DAILY PRN
Qty: 12 TABLET | Refills: 0 | Status: SHIPPED | OUTPATIENT
Start: 2021-01-15 | End: 2021-02-02

## 2021-01-15 RX ORDER — FLUCONAZOLE 150 MG/1
150 TABLET ORAL
Qty: 2 TABLET | Refills: 0 | Status: SHIPPED | OUTPATIENT
Start: 2021-01-15 | End: 2021-01-21

## 2021-01-15 RX ORDER — DOXYCYCLINE HYCLATE 100 MG
100 TABLET ORAL 2 TIMES DAILY
Qty: 20 TABLET | Refills: 0 | Status: SHIPPED | OUTPATIENT
Start: 2021-01-15 | End: 2021-01-25

## 2021-01-15 ASSESSMENT — PATIENT HEALTH QUESTIONNAIRE - PHQ9
SUM OF ALL RESPONSES TO PHQ9 QUESTIONS 1 & 2: 0
SUM OF ALL RESPONSES TO PHQ QUESTIONS 1-9: 0
SUM OF ALL RESPONSES TO PHQ QUESTIONS 1-9: 0

## 2021-01-15 NOTE — PROGRESS NOTES
SUBJECTIVE:    Patient ID: Eve Maria is a 25 y.o.male. Chief Complaint   Patient presents with    Dysuria     x 4 days         HPI:    Pt c/o dysuria 3-4 days. Also c/o Urinary frequency, urgency. Some clear discharge from penis 1-2 times but not yellow or green. Some redness penis tip with slight pain but better today. Unsure if sexual partner had yeast infection. No change in sexual partner but still wants to be checked for STD. Denies genital sore, penis pain, scrotal pain, hematuria, flank pain. He reports this happened few years ago and took antibiotic and resolved. No treatment regimen. Patient's medications, allergies, past medical, surgical, social and family histories were reviewed and updated as appropriate in electronic medical record. No outpatient medications have been marked as taking for the 1/15/21 encounter (Appointment) with SADIE De Souza CNP. Review of Systems   Constitutional: Negative for chills, diaphoresis, fatigue and fever. HENT: Negative. Respiratory: Negative. Cardiovascular: Negative. Gastrointestinal: Negative for abdominal pain, constipation, diarrhea, nausea and vomiting. Genitourinary: Positive for discharge, dysuria, frequency and urgency. Negative for decreased urine volume, genital sores, hematuria, penile pain, penile swelling, scrotal swelling and testicular pain. Musculoskeletal: Negative. Skin: Negative.         Past Medical History:   Diagnosis Date    ADD (attention deficit disorder)     Allergic rhinitis     Anxiety     Depression     Hypertension     OCD (obsessive compulsive disorder)     PTSD (post-traumatic stress disorder)      Past Surgical History:   Procedure Laterality Date    KNEE ARTHROSCOPY Left      Family History   Problem Relation Age of Onset    No Known Problems Mother     No Known Problems Father       Social History     Tobacco Use   Smoking Status Never Smoker UA cloudy; having symptoms; will treat. Take meds as directed. RTC if needed. - doxycycline hyclate (VIBRA-TABS) 100 MG tablet; Take 1 tablet by mouth 2 times daily for 10 days  Dispense: 20 tablet; Refill: 0  - phenazopyridine (PYRIDIUM) 100 MG tablet; Take 1 tablet by mouth 3 times daily as needed for Pain  Dispense: 12 tablet;  Refill: 0        Orders Placed This Encounter   Medications    doxycycline hyclate (VIBRA-TABS) 100 MG tablet     Sig: Take 1 tablet by mouth 2 times daily for 10 days     Dispense:  20 tablet     Refill:  0    phenazopyridine (PYRIDIUM) 100 MG tablet     Sig: Take 1 tablet by mouth 3 times daily as needed for Pain     Dispense:  12 tablet     Refill:  0    fluconazole (DIFLUCAN) 150 MG tablet     Sig: Take 1 tablet by mouth every 72 hours for 6 days     Dispense:  2 tablet     Refill:  0

## 2021-01-15 NOTE — PROGRESS NOTES
Chief Complaint   Patient presents with    Dysuria     x 4 days       Have you seen any other physician or provider since your last visit no    Have you had any other diagnostic tests since your last visit? no    Have you changed or stopped any medications since your last visit? no     Patient has been having some sharp pain in his bladder x 4 days. He is also having some swelling in his penis. He states it stings when he urinates.

## 2021-01-15 NOTE — PATIENT INSTRUCTIONS
· Keep a list of your medicines with you. List all of the prescription medicines, nonprescription medicines, supplements, natural remedies, and vitamins that you take. Tell your healthcare providers who treat you about all of the products you are taking. Your provider can provide you with a form to keep track of them. Just ask. · Follow the directions that come with your medicine, including information about food or alcohol. Make sure you know how and when to take your medicine. Do not take more or less than you are supposed to take. · Keep all medicines out of the reach of children. · Store medicines according to the directions on the label. · Monitor yourself. Learn to know how your body reacts to your new medicine and keep track of how it makes you feel before attempting (If your provider has allowed you to do so) to drive or go to work. · Seek emergency medical attention if you think you have used too much of this medicine. An overdose of any prescription medicine can be fatal. Overdose symptoms may include extreme drowsiness, muscle weakness, confusion, cold and clammy skin, pinpoint pupils, shallow breathing, slow heart rate, fainting, or coma. · Don't share prescription medicines with others, even when they seem to have the same symptoms. What may be good for you may be harmful to others. · If you are no longer taking a prescribed medication and you have pills left please take your pills out of their original containers. Mix crushed pills with an undesirable substance, such as cat litter or used coffee grounds. Put the mixture into a disposable container with a lid, such as an empty margarine tub, or into a sealable bag. Cover up or remove any of your personal information on the empty containers by covering it with black permanent marker or duct tape. Place the sealed container with the mixture, and the empty drug containers, in the trash. · If you use a medication that is in the form of a patch, dispose of used patches by folding them in half so that the sticky sides meet, and then flushing them down a toilet. They should not be placed in the household trash where children or pets can find them. · If you have any questions, ask your provider or pharmacist for more information. · Be sure to keep all appointments for provider visits or tests. Keep a list of your medicines with you. List all of the prescription medicines, nonprescription medicines, supplements, natural remedies, and vitamins that you take. Tell your healthcare providers who treat you about all of the products you are taking. Your provider can provide you with a form to keep track of them. Just ask. Follow the directions that come with your medicine, including information about food or alcohol. Make sure you know how and when to take your medicine. Do not take more or less than you are supposed to take. Keep all medicines out of the reach of children. Store medicines according to the directions on the label. Monitor yourself. Learn to know how your body reacts to your new medicine and keep track of how it makes you feel before attempting (If your provider has allowed you to do so) to drive or go to work. Seek emergency medical attention if you think you have used too much of this medicine. An overdose of any prescription medicine can be fatal. Overdose symptoms may include extreme drowsiness, muscle weakness, confusion, cold and clammy skin, pinpoint pupils, shallow breathing, slow heart rate, fainting, or coma. Don't share prescription medicines with others, even when they seem to have the same symptoms. What may be good for you may be harmful to others. If you are no longer taking a prescribed medication and you have pills left please take your pills out of their original containers. Mix crushed pills with an undesirable substance, such as cat litter or used coffee grounds. Put the mixture into a disposable container with a lid, such as an empty margarine tub, or into a sealable bag. Cover up or remove any of your personal information on the empty containers by covering it with black permanent marker or duct tape. Place the sealed container with the mixture, and the empty drug containers, in the trash. If you use a medication that is in the form of a patch, dispose of used patches by folding them in half so that the sticky sides meet, and then flushing them down a toilet. They should not be placed in the household trash where children or pets can find them. If you have any questions, ask your provider or pharmacist for more information. Be sure to keep all appointments for provider visits or tests. We are committed to providing you with the best care possible. In order to help us achieve these goals please remember to bring all medications, herbal products, and over the counter supplements with you to each visit. If your provider has ordered testing for you, please be sure to follow up with our office if you have not received results within 7 days after the testing took place. *If you receive a survey after visiting one of our offices, please take time to share your experience concerning your physician office visit. These surveys are confidential and no health information about you is shared. We are eager to improve for you and we are counting on your feedback to help make that happen.

## 2021-01-18 LAB
C. TRACHOMATIS DNA ,URINE: NEGATIVE
N. GONORRHOEAE DNA, URINE: NEGATIVE

## 2021-01-19 ENCOUNTER — TELEPHONE (OUTPATIENT)
Dept: PRIMARY CARE CLINIC | Age: 26
End: 2021-01-19

## 2021-01-19 DIAGNOSIS — E55.9 VITAMIN D DEFICIENCY, UNSPECIFIED: Primary | ICD-10-CM

## 2021-01-19 LAB — VITAMIN B1, PLASMA: 10 NMOL/L (ref 4–15)

## 2021-01-19 RX ORDER — M-VIT,TX,IRON,MINS/CALC/FOLIC 27MG-0.4MG
1 TABLET ORAL DAILY
Qty: 30 TABLET | Refills: 3 | Status: SHIPPED | OUTPATIENT
Start: 2021-01-19 | End: 2022-06-07

## 2021-01-19 RX ORDER — MELATONIN
1000 DAILY
Qty: 30 TABLET | Refills: 3 | Status: SHIPPED | OUTPATIENT
Start: 2021-01-19 | End: 2021-02-02

## 2021-01-19 NOTE — TELEPHONE ENCOUNTER
Tried calling patient to discuss results. Please see lab result note and inform patient if he calls back.

## 2021-01-19 NOTE — PROGRESS NOTES
Vit D can cause constipation so take OTC miralax as needed to prevent constipation. Increase water intake.

## 2021-01-20 DIAGNOSIS — E55.9 VITAMIN D DEFICIENCY, UNSPECIFIED: ICD-10-CM

## 2021-01-20 DIAGNOSIS — E53.8 B12 DEFICIENCY: Primary | ICD-10-CM

## 2021-01-20 RX ORDER — CYANOCOBALAMIN (VITAMIN B-12) 100 MCG
1000 TABLET ORAL DAILY
Qty: 30 TABLET | Refills: 5 | Status: SHIPPED | OUTPATIENT
Start: 2021-01-20 | End: 2022-06-07

## 2021-01-20 RX ORDER — CHOLECALCIFEROL (VITAMIN D3) 50 MCG
4000 CAPSULE ORAL DAILY
Qty: 30 CAPSULE | Refills: 5 | Status: SHIPPED | OUTPATIENT
Start: 2021-01-20 | End: 2022-06-07

## 2021-01-25 ASSESSMENT — ENCOUNTER SYMPTOMS
NAUSEA: 0
DIARRHEA: 0
VOMITING: 0
CONSTIPATION: 0
RESPIRATORY NEGATIVE: 1
ABDOMINAL PAIN: 0

## 2021-02-01 NOTE — PROGRESS NOTES
Health Maintenance Due This Visit   Colonoscopy No   Mammogram No   Annual Wellness Visit No   Microalbumin No   HgbA1C No   Diabetic Eye Exam No    House Bill One Due This Visit   CUONG No   UDS No   Contract No

## 2021-02-01 NOTE — PATIENT INSTRUCTIONS
The medication list included in this document is our record of what you are currently taking, including any changes that were made at today's visit.  If you find any differences when compared to your medications at home, or have any questions that were not answered at your visit, please contact the office. · Keep a list of your medicines with you. List all of the prescription medicines, nonprescription medicines, supplements, natural remedies, and vitamins that you take. Tell your healthcare providers who treat you about all of the products you are taking. Your provider can provide you with a form to keep track of them. Just ask. · Follow the directions that come with your medicine, including information about food or alcohol. Make sure you know how and when to take your medicine. Do not take more or less than you are supposed to take. · Keep all medicines out of the reach of children. · Store medicines according to the directions on the label. · Monitor yourself. Learn to know how your body reacts to your new medicine and keep track of how it makes you feel before attempting (If your provider has allowed you to do so) to drive or go to work. · Seek emergency medical attention if you think you have used too much of this medicine. An overdose of any prescription medicine can be fatal. Overdose symptoms may include extreme drowsiness, muscle weakness, confusion, cold and clammy skin, pinpoint pupils, shallow breathing, slow heart rate, fainting, or coma. · Don't share prescription medicines with others, even when they seem to have the same symptoms. What may be good for you may be harmful to others. · If you are no longer taking a prescribed medication and you have pills left please take your pills out of their original containers. Mix crushed pills with an undesirable substance, such as cat litter or used coffee grounds. Put the mixture into a disposable container with a lid, such as an empty margarine tub, or into a sealable bag. Cover up or remove any of your personal information on the empty containers by covering it with black permanent marker or duct tape. Place the sealed container with the mixture, and the empty drug containers, in the trash. · If you use a medication that is in the form of a patch, dispose of used patches by folding them in half so that the sticky sides meet, and then flushing them down a toilet. They should not be placed in the household trash where children or pets can find them. · If you have any questions, ask your provider or pharmacist for more information. · Be sure to keep all appointments for provider visits or tests. We are committed to providing you with the best care possible. In order to help us achieve these goals please remember to bring all medications, herbal products, and over the counter supplements with you to each visit. If your provider has ordered testing for you, please be sure to follow up with our office if you have not received results within 7 days after the testing took place. *If you receive a survey after visiting one of our offices, please take time to share your experience concerning your physician office visit. These surveys are confidential and no health information about you is shared. We are eager to improve for you and we are counting on your feedback to help make that happen.                             Sensory diet: look up proprioceptive and tactile exercises

## 2021-02-02 ENCOUNTER — OFFICE VISIT (OUTPATIENT)
Dept: PRIMARY CARE CLINIC | Age: 26
End: 2021-02-02
Payer: COMMERCIAL

## 2021-02-02 VITALS
RESPIRATION RATE: 16 BRPM | HEART RATE: 85 BPM | TEMPERATURE: 97.8 F | BODY MASS INDEX: 31.18 KG/M2 | WEIGHT: 194 LBS | SYSTOLIC BLOOD PRESSURE: 122 MMHG | DIASTOLIC BLOOD PRESSURE: 86 MMHG | HEIGHT: 66 IN | OXYGEN SATURATION: 98 %

## 2021-02-02 DIAGNOSIS — E55.9 VITAMIN D DEFICIENCY, UNSPECIFIED: ICD-10-CM

## 2021-02-02 DIAGNOSIS — R53.83 FATIGUE, UNSPECIFIED TYPE: ICD-10-CM

## 2021-02-02 DIAGNOSIS — F10.10 ALCOHOL ABUSE: ICD-10-CM

## 2021-02-02 DIAGNOSIS — K21.9 GASTROESOPHAGEAL REFLUX DISEASE, UNSPECIFIED WHETHER ESOPHAGITIS PRESENT: Primary | ICD-10-CM

## 2021-02-02 DIAGNOSIS — E53.8 B12 DEFICIENCY: ICD-10-CM

## 2021-02-02 PROCEDURE — 99213 OFFICE O/P EST LOW 20 MIN: CPT | Performed by: NURSE PRACTITIONER

## 2021-02-02 RX ORDER — DEXLANSOPRAZOLE 60 MG/1
60 CAPSULE, DELAYED RELEASE ORAL DAILY
Qty: 90 CAPSULE | Refills: 3 | Status: SHIPPED | OUTPATIENT
Start: 2021-02-02 | End: 2022-06-07

## 2021-02-02 ASSESSMENT — ENCOUNTER SYMPTOMS
EYE REDNESS: 0
RHINORRHEA: 0
COUGH: 0
NAUSEA: 1
SHORTNESS OF BREATH: 0
VOMITING: 0
SORE THROAT: 0
EYE DISCHARGE: 0
DIARRHEA: 0
EYE ITCHING: 0
CONSTIPATION: 0
ABDOMINAL PAIN: 0

## 2021-02-02 NOTE — PROGRESS NOTES
Have you seen any other physician or provider since your last visit? No    Have you had any other diagnostic tests since your last visit? No    Have you changed or stopped any medications since your last visit? No    SUBJECTIVE:    Patient ID: Annamarie Gonzalez is a 23 y.o. male. Medical History Review  Past Medical, Family, and Social History reviewed. Health Maintenance Due   Topic Date Due    Hepatitis C screen  1995    HPV vaccine (1 - Male 2-dose series) 03/29/2008    HIV screen  03/29/2012    DTaP/Tdap/Td vaccine (1 - Tdap) 03/29/2016    Varicella vaccine (2 of 2 - 13+ 2-dose series) 01/02/2018    Flu vaccine (1) 09/01/2020       HPI:   Chief Complaint   Patient presents with    Other     when eating/drinking gets stuck in lower chest. makes him sick. Pt c/o issues eating and drinking. Ongoing x 4 days. When he eats or drinks it gets stuck in his epigastric area. He states eating makes him super sick. He used to drink alcohol daily, but now slowed down to a few times a week. He drinks 100 proof vodka as drink of choice. Acidic food makes his symptoms worse. He has some stomach cramping. Patient's medications, allergies, past medical, surgical, social and family histories were reviewed and updated as appropriate. Review of Systems   Constitutional: Negative for chills, fatigue and fever. HENT: Negative for congestion, ear pain, rhinorrhea and sore throat. Eyes: Negative for discharge, redness and itching. Respiratory: Negative for cough and shortness of breath. Cardiovascular: Negative for chest pain, palpitations and leg swelling. Gastrointestinal: Positive for nausea. Negative for abdominal pain, constipation, diarrhea and vomiting. Endocrine: Negative for cold intolerance and heat intolerance. Genitourinary: Negative for dysuria. Musculoskeletal: Negative for arthralgias and joint swelling. Skin: Negative for rash and wound. Neurological: Negative for weakness and headaches. Hematological: Negative for adenopathy. Psychiatric/Behavioral: Negative for dysphoric mood and sleep disturbance. The patient is not nervous/anxious. Reviewed and acurate. See MA note. OBJECTIVE:  /86 (Site: Right Upper Arm, Position: Sitting)   Pulse 85   Temp 97.8 °F (36.6 °C) (Temporal)   Resp 16   Ht 5' 6\" (1.676 m)   Wt 194 lb (88 kg)   SpO2 98% Comment: room air  BMI 31.31 kg/m²    Physical Exam  Constitutional:       Appearance: He is well-developed. HENT:      Head: Normocephalic and atraumatic. Right Ear: External ear normal.      Left Ear: External ear normal.   Eyes:      Conjunctiva/sclera: Conjunctivae normal.      Pupils: Pupils are equal, round, and reactive to light. Neck:      Musculoskeletal: Neck supple. Thyroid: No thyromegaly. Vascular: No JVD. Cardiovascular:      Rate and Rhythm: Normal rate and regular rhythm. Heart sounds: Normal heart sounds. No murmur. No friction rub. No gallop. Pulmonary:      Effort: Pulmonary effort is normal. No respiratory distress. Breath sounds: Normal breath sounds. Abdominal:      General: Bowel sounds are normal. There is no distension. Palpations: Abdomen is soft. Tenderness: There is no abdominal tenderness. Musculoskeletal: Normal range of motion. Lymphadenopathy:      Cervical: No cervical adenopathy. Skin:     General: Skin is warm and dry. Neurological:      Mental Status: He is alert and oriented to person, place, and time. Cranial Nerves: No cranial nerve deficit. No results found for requested labs within last 30 days.      LDL Calculated (mg/dL)   Date Value   01/15/2021 110       Lab Results   Component Value Date    WBC 5.7 01/15/2021    NEUTROABS 2.2 01/15/2021    HGB 16.3 01/15/2021    HCT 50.2 01/15/2021    MCV 92.3 01/15/2021     01/15/2021     Lab Results   Component Value Date TSH 1.75 01/15/2021       Prior to Visit Medications    Medication Sig Taking? Authorizing Provider   Doxycycline Hyclate (DOXY-CAPS PO) Take by mouth Yes Historical Provider, MD   dexlansoprazole (DEXILANT) 60 MG CPDR delayed release capsule Take 1 capsule by mouth daily 340B Yes Alma Shock, APRN   Cyanocobalamin (B-12-SL) 1000 MCG SUBL Place 1,000 mcg under the tongue daily Yes Alma Shock, APRN   Cholecalciferol (HM VITAMIN D3) 100 MCG (4000 UT) CAPS Take 4,000 Units by mouth daily Yes Alma Shock, APRN   Multiple Vitamins-Minerals (THERAPEUTIC MULTIVITAMIN-MINERALS) tablet Take 1 tablet by mouth daily Yes Shai Husbands, APRN - CNP       ASSESSMENT:  1. Gastroesophageal reflux disease, unspecified whether esophagitis present    2. B12 deficiency    3. Vitamin D deficiency, unspecified    4. Fatigue, unspecified type    5. Alcohol abuse        PLAN:  Orders Placed This Encounter   Medications    dexlansoprazole (DEXILANT) 60 MG CPDR delayed release capsule     Sig: Take 1 capsule by mouth daily 340B     Dispense:  90 capsule     Refill:  3       Patient Instructions   The medication list included in this document is our record of what you are currently taking, including any changes that were made at today's visit.  If you find any differences when compared to your medications at home, or have any questions that were not answered at your visit, please contact the office. · Keep a list of your medicines with you. List all of the prescription medicines, nonprescription medicines, supplements, natural remedies, and vitamins that you take. Tell your healthcare providers who treat you about all of the products you are taking. Your provider can provide you with a form to keep track of them. Just ask. · Follow the directions that come with your medicine, including information about food or alcohol. Make sure you know how and when to take your medicine. Do not take more or less than you are supposed to take. · Keep all medicines out of the reach of children. · Store medicines according to the directions on the label. · Monitor yourself. Learn to know how your body reacts to your new medicine and keep track of how it makes you feel before attempting (If your provider has allowed you to do so) to drive or go to work. · Seek emergency medical attention if you think you have used too much of this medicine. An overdose of any prescription medicine can be fatal. Overdose symptoms may include extreme drowsiness, muscle weakness, confusion, cold and clammy skin, pinpoint pupils, shallow breathing, slow heart rate, fainting, or coma. · Don't share prescription medicines with others, even when they seem to have the same symptoms. What may be good for you may be harmful to others. · If you are no longer taking a prescribed medication and you have pills left please take your pills out of their original containers. Mix crushed pills with an undesirable substance, such as cat litter or used coffee grounds. Put the mixture into a disposable container with a lid, such as an empty margarine tub, or into a sealable bag. Cover up or remove any of your personal information on the empty containers by covering it with black permanent marker or duct tape. Place the sealed container with the mixture, and the empty drug containers, in the trash. · If you use a medication that is in the form of a patch, dispose of used patches by folding them in half so that the sticky sides meet, and then flushing them down a toilet. They should not be placed in the household trash where children or pets can find them. · If you have any questions, ask your provider or pharmacist for more information. · Be sure to keep all appointments for provider visits or tests. We are committed to providing you with the best care possible. In order to help us achieve these goals please remember to bring all medications, herbal products, and over the counter supplements with you to each visit. If your provider has ordered testing for you, please be sure to follow up with our office if you have not received results within 7 days after the testing took place. *If you receive a survey after visiting one of our offices, please take time to share your experience concerning your physician office visit. These surveys are confidential and no health information about you is shared. We are eager to improve for you and we are counting on your feedback to help make that happen. Sensory diet: look up proprioceptive and tactile exercises     Return if symptoms worsen or fail to improve. Lele Bruno CMA am scribing for and in the presence of SADIE West on 2/22/2021 at 6:31 PM.      Evie NOEL, personally performed the services described in the documentation as scribed by Jatinder Aviles CMA, in my presence and it is both accurate and complete.

## 2021-02-02 NOTE — LETTER
[facility]  [address] [address]  Phone: [phone]  Fax: [phone]    SADIE Garcia        February 2, 2021     Patient: Tamie Pena   YOB: 1995   Date of Visit: 2/2/2021       To Whom it May Concern:    Shahida Guzman was seen in my clinic on 2/2/2021. He may return to work on 2/3/21. Please also excuse 2/1/21. If you have any questions or concerns, please don't hesitate to call.     Sincerely,         SADIE Garcia

## 2021-05-12 ENCOUNTER — OFFICE VISIT (OUTPATIENT)
Dept: PRIMARY CARE CLINIC | Age: 26
End: 2021-05-12
Payer: COMMERCIAL

## 2021-05-12 VITALS — TEMPERATURE: 97 F | OXYGEN SATURATION: 96 % | HEART RATE: 109 BPM

## 2021-05-12 DIAGNOSIS — R05.9 COUGH: ICD-10-CM

## 2021-05-12 DIAGNOSIS — Z20.822 SUSPECTED COVID-19 VIRUS INFECTION: Primary | ICD-10-CM

## 2021-05-12 DIAGNOSIS — Z71.89 EDUCATED ABOUT COVID-19 VIRUS INFECTION: ICD-10-CM

## 2021-05-12 LAB
Lab: NORMAL
PERFORMING INSTRUMENT: NORMAL
QC PASS/FAIL: NORMAL
SARS-COV-2, POC: NORMAL

## 2021-05-12 PROCEDURE — 99211 OFF/OP EST MAY X REQ PHY/QHP: CPT | Performed by: NURSE PRACTITIONER

## 2021-05-12 PROCEDURE — 87426 SARSCOV CORONAVIRUS AG IA: CPT | Performed by: NURSE PRACTITIONER

## 2021-05-29 NOTE — PROGRESS NOTES
2021    Inder Miller (:  1995) is a 27 y.o. male, here requesting COVID-19 testing    History of Present Illness  cough    Vitals:    21 1502   Pulse: 109   Temp: 97 °F (36.1 °C)   TempSrc: Temporal   SpO2: 96%       ASSESSMENT  Screening for COVID-19/ Viral disease    PLAN  POCT influenza testing not done   COVID-19 sample collected and submitted  Patient given detailed CDC instructions contained within After Visit Summary       An  electronic signature was used to authenticate this note.     --SADIE Graf - CNP on 2021 at 3:20 PM

## 2021-06-24 ENCOUNTER — OFFICE VISIT (OUTPATIENT)
Dept: PRIMARY CARE CLINIC | Age: 26
End: 2021-06-24
Payer: COMMERCIAL

## 2021-06-24 VITALS
TEMPERATURE: 97.8 F | HEART RATE: 105 BPM | WEIGHT: 203 LBS | DIASTOLIC BLOOD PRESSURE: 90 MMHG | OXYGEN SATURATION: 97 % | BODY MASS INDEX: 32.77 KG/M2 | SYSTOLIC BLOOD PRESSURE: 144 MMHG

## 2021-06-24 DIAGNOSIS — H92.01 ACUTE OTALGIA, RIGHT: Primary | ICD-10-CM

## 2021-06-24 DIAGNOSIS — H61.21 IMPACTED CERUMEN OF RIGHT EAR: ICD-10-CM

## 2021-06-24 PROCEDURE — 69209 REMOVE IMPACTED EAR WAX UNI: CPT | Performed by: PHYSICIAN ASSISTANT

## 2021-06-24 PROCEDURE — 99213 OFFICE O/P EST LOW 20 MIN: CPT | Performed by: PHYSICIAN ASSISTANT

## 2021-06-24 RX ORDER — AMOXICILLIN 875 MG/1
875 TABLET, COATED ORAL 2 TIMES DAILY
Qty: 20 TABLET | Refills: 0 | Status: SHIPPED | OUTPATIENT
Start: 2021-06-24 | End: 2021-07-04

## 2021-06-24 ASSESSMENT — ENCOUNTER SYMPTOMS
RESPIRATORY NEGATIVE: 1
GASTROINTESTINAL NEGATIVE: 1

## 2021-06-24 NOTE — PROGRESS NOTES
SUBJECTIVE:    Patient ID: Danica Hayes is a 26 y.o.male. Chief Complaint   Patient presents with    Otalgia     Right Ear    Ear Fullness       HPI:     pt here with c/o of bilateral ear pain and fullness. States he has had cerumen impactions in the past and had to have his ear flushed. Denies URI sxs, recent dental work. Pt has been swimming lately. He works in [employer] with cont flucutations from hot to cold environments. Patient's medications, allergies, past medical, surgical, social and family histories were reviewed and updated as appropriate in electronic medical record. Current Outpatient Medications on File Prior to Visit   Medication Sig Dispense Refill    Cyanocobalamin (B-12-SL) 1000 MCG SUBL Place 1,000 mcg under the tongue daily 30 tablet 5    Cholecalciferol (HM VITAMIN D3) 100 MCG (4000 UT) CAPS Take 4,000 Units by mouth daily 30 capsule 5    Multiple Vitamins-Minerals (THERAPEUTIC MULTIVITAMIN-MINERALS) tablet Take 1 tablet by mouth daily 30 tablet 3    dexlansoprazole (DEXILANT) 60 MG CPDR delayed release capsule Take 1 capsule by mouth daily 340B (Patient not taking: Reported on 5/12/2021) 90 capsule 3     No current facility-administered medications on file prior to visit. Review of Systems   Constitutional: Negative. HENT: Positive for ear pain and hearing loss. Respiratory: Negative. Cardiovascular: Negative. Gastrointestinal: Negative. Skin: Negative. Neurological: Negative.         Past Medical History:   Diagnosis Date    ADD (attention deficit disorder)     Allergic rhinitis     Anxiety     Depression     Hypertension     OCD (obsessive compulsive disorder)     PTSD (post-traumatic stress disorder)      Past Surgical History:   Procedure Laterality Date    KNEE ARTHROSCOPY Left      Family History   Problem Relation Age of Onset    No Known Problems Mother     No Known Problems Father       Social History     Tobacco Use CREATININE 0.9 01/15/2021    CALCIUM 10.4 01/15/2021    PROT 7.5 01/15/2021    LABALBU 5.1 01/15/2021    BILITOT 0.7 01/15/2021    ALT 26 01/15/2021    AST 25 01/15/2021     LDL Calculated (mg/dL)   Date Value   01/15/2021 110       Lab Results   Component Value Date    WBC 5.7 01/15/2021    NEUTROABS 2.2 01/15/2021    HGB 16.3 01/15/2021    HCT 50.2 01/15/2021    MCV 92.3 01/15/2021     01/15/2021     Lab Results   Component Value Date    TSH 1.75 01/15/2021       ASSESSMENT/PLAN:     1. Acute otalgia, right  - amoxicillin (AMOXIL) 875 MG tablet; Take 1 tablet by mouth 2 times daily for 10 days  Dispense: 20 tablet; Refill: 0    2. Impacted cerumen of right ear  No h/o perf or tubes  - right ear flush, pt tolerated well      No orders of the defined types were placed in this encounter.        Electronically signed by Yin Greenwood on 6/24/2021 at 1:58 PM

## 2021-08-10 ENCOUNTER — OFFICE VISIT (OUTPATIENT)
Dept: PRIMARY CARE CLINIC | Age: 26
End: 2021-08-10
Payer: COMMERCIAL

## 2021-08-10 VITALS — HEART RATE: 123 BPM | TEMPERATURE: 97.6 F | OXYGEN SATURATION: 97 %

## 2021-08-10 DIAGNOSIS — Z20.822 SUSPECTED COVID-19 VIRUS INFECTION: ICD-10-CM

## 2021-08-10 DIAGNOSIS — Z11.52 ENCOUNTER FOR SCREENING FOR COVID-19: Primary | ICD-10-CM

## 2021-08-10 PROCEDURE — 99211 OFF/OP EST MAY X REQ PHY/QHP: CPT | Performed by: NURSE PRACTITIONER

## 2021-08-10 RX ORDER — PREDNISONE 10 MG/1
10 TABLET ORAL 2 TIMES DAILY
Qty: 10 TABLET | Refills: 0 | Status: SHIPPED | OUTPATIENT
Start: 2021-08-10 | End: 2021-08-15

## 2021-08-10 RX ORDER — AZITHROMYCIN 250 MG/1
250 TABLET, FILM COATED ORAL SEE ADMIN INSTRUCTIONS
Qty: 6 TABLET | Refills: 0 | Status: SHIPPED | OUTPATIENT
Start: 2021-08-10 | End: 2021-08-15

## 2021-08-10 NOTE — PROGRESS NOTES
8/10/2021    Tera Murrieta (:  1995) is a 27 y.o. male, here requesting COVID-19 testing    History of Present Illness  Sore throat, body aches, congestion. Hx coid in the past. Not vaccinated. Vitals:    08/10/21 1348   Pulse: 123   Temp: 97.6 °F (36.4 °C)   TempSrc: Temporal   SpO2: 97%       ASSESSMENT  Screening for COVID-19/ Viral disease    PLAN  POCT influenza testing not done  COVID-19 sample collected and submitted  Patient given detailed CDC instructions contained within After Visit Summary       An  electronic signature was used to authenticate this note.     --SADIE Holbrook - CNP on 8/10/2021 at 2:28 PM

## 2021-09-30 ENCOUNTER — OFFICE VISIT (OUTPATIENT)
Dept: PRIMARY CARE CLINIC | Age: 26
End: 2021-09-30
Payer: COMMERCIAL

## 2021-09-30 VITALS
HEART RATE: 88 BPM | TEMPERATURE: 97.5 F | WEIGHT: 207.8 LBS | OXYGEN SATURATION: 97 % | BODY MASS INDEX: 33.54 KG/M2 | DIASTOLIC BLOOD PRESSURE: 89 MMHG | SYSTOLIC BLOOD PRESSURE: 136 MMHG

## 2021-09-30 DIAGNOSIS — F10.10 ALCOHOL ABUSE: Primary | ICD-10-CM

## 2021-09-30 DIAGNOSIS — F10.280 ALCOHOL DEPENDENCE WITH ALCOHOL-INDUCED ANXIETY DISORDER (HCC): ICD-10-CM

## 2021-09-30 DIAGNOSIS — F41.9 ANXIETY: Primary | ICD-10-CM

## 2021-09-30 DIAGNOSIS — F10.939 ALCOHOL WITHDRAWAL SYNDROME WITH COMPLICATION (HCC): ICD-10-CM

## 2021-09-30 PROCEDURE — 99214 OFFICE O/P EST MOD 30 MIN: CPT | Performed by: NURSE PRACTITIONER

## 2021-09-30 RX ORDER — DIAZEPAM 5 MG/1
5-10 TABLET ORAL EVERY 8 HOURS PRN
Qty: 30 TABLET | Refills: 0 | Status: SHIPPED | OUTPATIENT
Start: 2021-09-30 | End: 2021-10-10

## 2021-09-30 RX ORDER — CLONIDINE HYDROCHLORIDE 0.1 MG/1
0.1 TABLET ORAL 2 TIMES DAILY PRN
Qty: 60 TABLET | Refills: 1 | Status: SHIPPED | OUTPATIENT
Start: 2021-09-30 | End: 2022-06-07

## 2021-09-30 RX ORDER — PROMETHAZINE HYDROCHLORIDE 12.5 MG/1
12.5 TABLET ORAL EVERY 6 HOURS PRN
Qty: 60 TABLET | Refills: 1 | Status: SHIPPED | OUTPATIENT
Start: 2021-09-30 | End: 2022-06-07

## 2021-09-30 RX ORDER — HYDROXYZINE PAMOATE 25 MG/1
25 CAPSULE ORAL 4 TIMES DAILY PRN
Qty: 90 CAPSULE | Refills: 1 | Status: SHIPPED | OUTPATIENT
Start: 2021-09-30 | End: 2022-06-07

## 2021-09-30 ASSESSMENT — ENCOUNTER SYMPTOMS
NAUSEA: 0
VOMITING: 0
EYES NEGATIVE: 1
DIARRHEA: 0
RESPIRATORY NEGATIVE: 1

## 2021-09-30 NOTE — PATIENT INSTRUCTIONS
Medications:  -Vistaril 25mg four times daily as needed for anxiety, body aches and nightly for sleep.   -Clonidine 0.1 three times daily as needed PRN if top number blood pressure greater than 150.  -Phenergan 12.5 mg every 6 hours as needed nausea, vomiting.   -Defer to PCP for valium dosing. -F/u PCP 2 weeks Shawn Marinelli. PCP aware of situation and agreeable to plans of care.         -[facility] [phone]. She will also reach out to patient and mother to offer further support as needed and resources.      AA Meetings:  [address] [facility] [address], [place]   [phone]  Meetings every Monday at [address] in the meeting room at [facility]. [address], [place] [address]  Meetings every Thursday 8 PM.        Patient Education        Patient Education        Learning About Alcohol Withdrawal  What is alcohol withdrawal?     If you drink alcohol regularly (more than a few drinks on most days) and then suddenly stop or cut down, you may go through some physical and emotional problems while the alcohol clears out of your system. This is called withdrawal. Clearing the alcohol from your body is called detoxification, or detox. What are the symptoms? Symptoms of alcohol withdrawal may start as soon as 4 to 12 hours after you stop drinking. Or they may not start until several days after the last drink. Mild symptoms include:  · Nausea. · Sweating. · Shakiness. · Diarrhea. · Intense worry. · Disturbed sleep. · Headache. More severe symptoms include:  · Vomiting or belly pain. · Being confused, upset, and irritable. · Changed sensations. You might feel things on your body that aren't really there. Or you may see or hear things that aren't there. · Trembling. · Being short of breath or having pain in your chest.  · Having seizures. Symptoms may peak within a few days. Mild symptoms can last for a few weeks.  If your symptoms are severe, you'll need to see a doctor. What is the treatment for alcohol withdrawal?  Most people may be able to cut down or stop drinking with only mild withdrawal. They can stay safe by simply resting, drinking lots of fluids, and eating healthy foods. But people who drink large amounts of alcohol or are at risk for severe withdrawal symptoms should not try to detox at home unless they work closely with a doctor to manage it. A person can die of severe alcohol withdrawal.  Before you stop drinking, talk to your doctor about how you plan to stop. Be completely honest about how much you've been drinking. Your doctor will figure out if you need to detox in a medical center. You may get medicine to treat the symptoms whether you are at home or in a medical center. Medicine that treats seizures can also help. Your doctor will explain what types of medicine might help you. You may start with a high dose and then take smaller amounts over several days. There's also medicine that can help you avoid alcohol while you recover. How can you manage your withdrawal and recovery? Here are a few tips that can help you to not start drinking again. · Make sure there's no alcohol in the house. This includes drinks as well as liquid medicines, rubbing alcohol, and certain flavorings like vanilla extract. · Try not to hang out with people you used to drink with. · Don't go it alone. Spend time with people who support the changes you are making in your life. This includes asking for advice and help from people who have stopped drinking. You might also try mutual support groups such as Alcoholics Anonymous. · Drink lots of fluids. · Eat snacks such as fruit, cheese and crackers, and pretzels. High-carbohydrate foods may help reduce the craving for alcohol. What happens after withdrawal?  It can be hard to stop drinking. But after you clear the alcohol from your system, you can start the next, healthier part of your life.   After detox, you will focus on staying alcohol-free. You can learn skills that you can use to stay abstinent (or sober) as you recover. Finding new ways to deal with life's challenges, without drinking, takes time and effort. Recovery is a long-term process. It's not something you can achieve in a few weeks. Most people get some type of therapy, such as group counseling. You also may need medicine to help you stay sober. Treatment doesn't focus on alcohol use alone. It may address other parts of your life, like your relationships, work, medical problems, and home life. Treatment, support, patience, and commitment will help you make the changes you need to live a garcia life without alcohol. You may find, over time, that the process gets easier, life becomes more joyous, and your connections to others becomes more rewarding. Where can you find help? Behavioral Health Treatment Services . This service from the [facility] and [facility]  can help you find local alcohol treatment services. Search online at EyeTechCare. Philanthropediaa.gov or call 2-405-666-RUBY (398 024 174), or DailyBurn 7-443.916.4322. Where can you learn more? Go to https://Horse Sense Shoes.Gen110. org and sign in to your WellRight account. Enter K472 in the Biomeasure box to learn more about \"Learning About Alcohol Withdrawal.\"     If you do not have an account, please click on the \"Sign Up Now\" link. Current as of: February 11, 2021               Content Version: 13.0  © 2006-2021 Healthwise, Incorporated. Care instructions adapted under license by [facility] ([facility]). If you have questions about a medical condition or this instruction, always ask your healthcare professional. [place] [address] any warranty or liability for your use of this information.            Alcohol Detoxification and Withdrawal: Care Instructions  Your Care Instructions     If you drink alcohol regularly and then emergency numbers near the phone. This should include your doctor, the police, the nearest hospital and emergency room, and neighbors who can help if needed. · Make sure all alcohol is removed from the house before you start. This includes beverages as well as medicines, rubbing alcohol, and certain flavorings like vanilla extract. · Keep \"drinking buddies\" away during the time you are going through detox. · Make your surroundings calm. Soft lights, soft music, and a comfortable place to sit or lie down can help make the process easier. · Drink lots of fluids and eat snacks such as fruit, cheese and crackers, and pretzels. Foods high in carbohydrate may help reduce the craving for alcohol. · Understand that detox is going to be hard. · Keep in mind that the people watching over you during detox are there to help. Explain to them before you start that you may not act like yourself until detox is finished. · Consider joining a support group such as Alcoholics Anonymous. Sharing your experiences with other people who face similar challenges may help you feel less overwhelmed. · Keep the numbers for these national suicide hotlines: 2-464-030-TALK (2-542.734.2561) and 6-265-HHQYKGQ (5-010-428-104-646-9359). If you or someone you know talks about suicide or feeling hopeless, get help right away. When should you call for help? Call 911 anytime you think you may need emergency care. For example, call if:    · You feel you cannot stop from hurting yourself or someone else.     · You vomit many times and cannot stop.     · You vomit blood or what looks like coffee grounds.     · You have trouble breathing or are breathing very fast.     · Your heart beats more than 120 times a minute and will not slow down.     · You have chest pain.     · You have a seizure.     · You see or feel things that are not there (hallucinate).    If you are caring for someone who is going through detox, call if:    · The person passes out (loses consciousness).     · The person sees or feels things that are not there and sees or hears the same things many times.     · The person is very agitated and will not calm down.     · The person becomes violent or threatens to be violent.     · The person has a seizure. Call your doctor now or seek immediate medical care if:    · You have a high fever.     · You have severe belly pain.     · You are very shaky. Watch closely for changes in your health, and be sure to contact your doctor if:    · You do not get better as expected. Where can you learn more? Go to https://CupomNowpepiceweb.KickAss Candy. org and sign in to your Xtium account. Enter [phone] in the Protenus box to learn more about \"Alcohol Detoxification and Withdrawal: Care Instructions. \"     If you do not have an account, please click on the \"Sign Up Now\" link. Current as of: February 11, 2021               Content Version: 13.0  © 2006-2021 Healthwise, Incorporated. Care instructions adapted under license by [facility] ([facility]). If you have questions about a medical condition or this instruction, always ask your healthcare professional. [place] [address] any warranty or liability for your use of this information.

## 2021-09-30 NOTE — PROGRESS NOTES
SUBJECTIVE:    Patient ID: Astrid Abarca is a 26 y.o.male. Chief Complaint   Patient presents with    Alcohol Problem    Anxiety         HPI:    Patient presents to clinic for alcohol problem and anxiety. History of ETOH abuse due to uncontrolled anxiety. Admits to drinking pint of vodka a day, sometimes more. Hx OCD, PTSD. Not on regimen to help. Tried multiple meds in the past and not helpful. Last time went to rehab/detox August 2020. Last drink last night. Does admit to bad dreams and hallucinations involving bugs. Does have a good support system at home. He is agreeable for me to call his mother, Mariana Sacks [phone] who is a teacher at the local [employer]. Reports he has tried AA meetings in the past and they were virtual and just did not seem to help. He does not want to go to a detox facility, he thinks that he can do better at home. His alcohol abuse has affected his job. It is negatively impacting his daily life. No tremors today. No agitation today. Patient is alert and oriented. Patient's medications, allergies, past medical, surgical, social and family histories were reviewed and updated as appropriate in electronic medical record. Outpatient Medications Marked as Taking for the 9/30/21 encounter (Office Visit) with SADIE Cain CNP   Medication Sig Dispense Refill    Cyanocobalamin (B-12-SL) 1000 MCG SUBL Place 1,000 mcg under the tongue daily 30 tablet 5    Cholecalciferol (HM VITAMIN D3) 100 MCG (4000 UT) CAPS Take 4,000 Units by mouth daily 30 capsule 5    Multiple Vitamins-Minerals (THERAPEUTIC MULTIVITAMIN-MINERALS) tablet Take 1 tablet by mouth daily 30 tablet 3        Review of Systems   Constitutional: Negative for chills, diaphoresis and fever. HENT: Negative. Eyes: Negative. Respiratory: Negative. Cardiovascular: Negative. Gastrointestinal: Negative for diarrhea, nausea and vomiting. Musculoskeletal: Negative. Psychiatric/Behavioral: Positive for hallucinations. The patient is nervous/anxious. Past Medical History:   Diagnosis Date    ADD (attention deficit disorder)     Allergic rhinitis     Anxiety     Depression     Hypertension     OCD (obsessive compulsive disorder)     PTSD (post-traumatic stress disorder)      Past Surgical History:   Procedure Laterality Date    KNEE ARTHROSCOPY Left      Family History   Problem Relation Age of Onset    No Known Problems Mother     No Known Problems Father       Social History     Tobacco Use   Smoking Status Never Smoker   Smokeless Tobacco Current User    Types: Snuff       OBJECTIVE:   Wt Readings from Last 3 Encounters:   09/30/21 207 lb 12.8 oz (94.3 kg)   06/24/21 203 lb (92.1 kg)   02/02/21 194 lb (88 kg)     BP Readings from Last 3 Encounters:   09/30/21 136/89   06/24/21 (!) 144/90   02/02/21 122/86       /89 (Site: Left Upper Arm, Position: Sitting, Cuff Size: Medium Adult)   Pulse 88   Temp 97.5 °F (36.4 °C) (Temporal)   Wt 207 lb 12.8 oz (94.3 kg)   SpO2 97%   BMI 33.54 kg/m²      Physical Exam  Vitals and nursing note reviewed. Constitutional:       General: He is not in acute distress. Appearance: Normal appearance. HENT:      Head: Normocephalic. Eyes:      Conjunctiva/sclera: Conjunctivae normal.   Cardiovascular:      Rate and Rhythm: Normal rate. Pulmonary:      Effort: Pulmonary effort is normal. No respiratory distress. Abdominal:      Tenderness: There is no guarding. Musculoskeletal:         General: Normal range of motion. Cervical back: Normal range of motion. Skin:     General: Skin is warm. Neurological:      General: No focal deficit present. Mental Status: He is alert and oriented to person, place, and time. Coordination: Coordination normal.      Gait: Gait normal.   Psychiatric:         Mood and Affect: Mood normal.         Behavior: Behavior normal.         Thought Content:  Thought content normal.         Judgment: Judgment normal.         Lab Results   Component Value Date     01/15/2021    K 4.4 01/15/2021     01/15/2021    CO2 28 01/15/2021    GLUCOSE 89 01/15/2021    BUN 9 01/15/2021    CREATININE 0.9 01/15/2021    CALCIUM 10.4 01/15/2021    PROT 7.5 01/15/2021    LABALBU 5.1 01/15/2021    BILITOT 0.7 01/15/2021    ALT 26 01/15/2021    AST 25 01/15/2021       LDL Calculated (mg/dL)   Date Value   01/15/2021 110         Lab Results   Component Value Date    WBC 5.7 01/15/2021    NEUTROABS 2.2 01/15/2021    HGB 16.3 01/15/2021    HCT 50.2 01/15/2021    MCV 92.3 01/15/2021     01/15/2021       Lab Results   Component Value Date    TSH 1.75 01/15/2021         ASSESSMENT/PLAN:     1. Alcohol abuse  2. Alcohol dependence with alcohol-induced anxiety disorder ([facility] [address]    -Long conversation with patient on plans of care. Also discussed the negative impacts of alcohol abuse. Patient verbalized understanding.  -Discussed with patient I would prefer him have a support person to be over his medications. He is agreeable for me to call his mother, [facility] [employer] [phone]. I did attempt to call Ms. Mccray and left voicemail at this time. I will send in Vistaril, clonidine, Phenergan at this time. Naeem Finely his PCP will be sending in Valium for specific dosing. He is to follow-up with her in 2 or 3 weeks to clinic. Patient agreeable to plans of care. Med regimen:  -Vistaril 25mg four times daily as needed for anxiety, body aches and nightly for sleep.   -Clonidine 0.1 three times daily as needed PRN if top number blood pressure greater than 150.  -Phenergan 12.5 mg every 6 hours as needed nausea, vomiting.   -Defer to PCP for valium dosing. -F/u PCP 2 weeks Naeem Finely. PCP aware of situation and agreeable to plans of care. -[facility] [phone].  She will also reach out to patient and mother to offer further support as needed and resources. AA Meetings:    -[address]  [address], [place] [address]   [phone]  Meetings every Monday at 7 PM    -Located in the meeting room at [facility].   [address], [place] [address]  Meetings every Thursday 8 PM.        Orders Placed This Encounter   Medications    hydrOXYzine (VISTARIL) 25 MG capsule     Sig: Take 1 capsule by mouth 4 times daily as needed for Anxiety     Dispense:  90 capsule     Refill:  1    promethazine (PHENERGAN) 12.5 MG tablet     Sig: Take 1 tablet by mouth every 6 hours as needed for Nausea     Dispense:  60 tablet     Refill:  1    cloNIDine (CATAPRES) 0.1 MG tablet     Sig: Take 1 tablet by mouth 2 times daily as needed for High Blood Pressure (take if top number blood pressure 150 or greater.)     Dispense:  60 tablet     Refill:  1

## 2021-10-01 NOTE — PROGRESS NOTES
9/30/21 4pm    Long conversation with Patient's mother, Georgia Chan at clinic. He appointed her as his main support person and she is agreeable to be over his medications. Education provided on medication regimen for outpatient treatment alcohol withdrawal and instructions given to mother. Answered all questions. Mother agreeable to plans of care. Will f/u PCP 2 weeks or sooner if needed. Discussed going to ED if worsening S&S. Mother verbalized understanding.

## 2021-10-21 ENCOUNTER — OFFICE VISIT (OUTPATIENT)
Dept: PRIMARY CARE CLINIC | Age: 26
End: 2021-10-21
Payer: COMMERCIAL

## 2021-10-21 VITALS
HEART RATE: 87 BPM | TEMPERATURE: 97.4 F | OXYGEN SATURATION: 96 % | WEIGHT: 207 LBS | RESPIRATION RATE: 16 BRPM | HEIGHT: 66 IN | SYSTOLIC BLOOD PRESSURE: 127 MMHG | BODY MASS INDEX: 33.27 KG/M2 | DIASTOLIC BLOOD PRESSURE: 76 MMHG

## 2021-10-21 DIAGNOSIS — F10.11 HISTORY OF ETOH ABUSE: Primary | ICD-10-CM

## 2021-10-21 DIAGNOSIS — F41.9 ANXIETY: ICD-10-CM

## 2021-10-21 PROCEDURE — 99213 OFFICE O/P EST LOW 20 MIN: CPT | Performed by: NURSE PRACTITIONER

## 2021-10-21 NOTE — PROGRESS NOTES
SUBJECTIVE:    Patient ID: Joni Acevedo is a 26 y.o.male. Chief Complaint   Patient presents with    1 Month Follow-Up         HPI:    Patient here for follow-up on alcohol abuse. Seen in clinic 9/30 and started outpatient detox for EtOH abuse. His mom was a support person and education was given to her. Today patient presents for follow-up. Reports last drink 10/2. Doing good. Take medications as directed. Reports no seizures or worsening issues. Reports the hydroxyzine has been helpful for his anxiety and OCD. He would like to continue it. Does report he has a good group of friends and support system around him. He has been going to the gym more. He is in [facility] and [facility] and the [facility].  Also has interview with [employer] tomorrow and this has been a goal of his. Overall patient significantly improved with last drink 10/2. Patient's medications, allergies, past medical, surgical, social and family histories were reviewed and updated as appropriate in electronic medical record.         Outpatient Medications Marked as Taking for the 10/21/21 encounter (Office Visit) with SADIE Dunaway CNP   Medication Sig Dispense Refill    hydrOXYzine (VISTARIL) 25 MG capsule Take 1 capsule by mouth 4 times daily as needed for Anxiety 90 capsule 1    promethazine (PHENERGAN) 12.5 MG tablet Take 1 tablet by mouth every 6 hours as needed for Nausea 60 tablet 1    cloNIDine (CATAPRES) 0.1 MG tablet Take 1 tablet by mouth 2 times daily as needed for High Blood Pressure (take if top number blood pressure 150 or greater.) 60 tablet 1    dexlansoprazole (DEXILANT) 60 MG CPDR delayed release capsule Take 1 capsule by mouth daily 340B 90 capsule 3    Cyanocobalamin (B-12-SL) 1000 MCG SUBL Place 1,000 mcg under the tongue daily 30 tablet 5    Cholecalciferol (HM VITAMIN D3) 100 MCG (4000 UT) CAPS Take 4,000 Units by mouth daily 30 capsule 5    Multiple Vitamins-Minerals (THERAPEUTIC MULTIVITAMIN-MINERALS) tablet Take 1 tablet by mouth daily 30 tablet 3        Review of Systems   Constitutional: Negative for chills, diaphoresis, fatigue and fever. HENT: Negative. Respiratory: Negative. Cardiovascular: Negative. Neurological: Negative. Psychiatric/Behavioral: Negative for agitation, behavioral problems, dysphoric mood, hallucinations and self-injury. The patient is nervous/anxious. Past Medical History:   Diagnosis Date    ADD (attention deficit disorder)     Allergic rhinitis     Anxiety     Depression     Hypertension     OCD (obsessive compulsive disorder)     PTSD (post-traumatic stress disorder)      Past Surgical History:   Procedure Laterality Date    KNEE ARTHROSCOPY Left      Family History   Problem Relation Age of Onset    No Known Problems Mother     No Known Problems Father       Social History     Tobacco Use   Smoking Status Never Smoker   Smokeless Tobacco Current User    Types: Snuff       OBJECTIVE:   Wt Readings from Last 3 Encounters:   10/21/21 207 lb (93.9 kg)   09/30/21 207 lb 12.8 oz (94.3 kg)   06/24/21 203 lb (92.1 kg)     BP Readings from Last 3 Encounters:   10/21/21 127/76   09/30/21 136/89   06/24/21 (!) 144/90       /76 (Site: Right Upper Arm, Position: Sitting, Cuff Size: Medium Adult)   Pulse 87   Temp 97.4 °F (36.3 °C) (Temporal)   Resp 16   Ht 5' 6\" (1.676 m)   Wt 207 lb (93.9 kg)   SpO2 96%   BMI 33.41 kg/m²      Physical Exam  Vitals and nursing note reviewed. Constitutional:       General: He is not in acute distress. Appearance: Normal appearance. HENT:      Head: Normocephalic. Eyes:      Conjunctiva/sclera: Conjunctivae normal.   Cardiovascular:      Rate and Rhythm: Normal rate and regular rhythm. Pulmonary:      Effort: Pulmonary effort is normal.      Breath sounds: Normal breath sounds. Abdominal:      Tenderness: There is no guarding. Musculoskeletal:         General: Normal range of motion. Cervical back: Normal range of motion. Neurological:      General: No focal deficit present. Mental Status: He is alert and oriented to person, place, and time. Psychiatric:         Mood and Affect: Mood normal.         Behavior: Behavior normal.         Thought Content: Thought content normal.         Judgment: Judgment normal.         Lab Results   Component Value Date     01/15/2021    K 4.4 01/15/2021     01/15/2021    CO2 28 01/15/2021    GLUCOSE 89 01/15/2021    BUN 9 01/15/2021    CREATININE 0.9 01/15/2021    CALCIUM 10.4 01/15/2021    PROT 7.5 01/15/2021    LABALBU 5.1 01/15/2021    BILITOT 0.7 01/15/2021    ALT 26 01/15/2021    AST 25 01/15/2021       LDL Calculated (mg/dL)   Date Value   01/15/2021 110         Lab Results   Component Value Date    WBC 5.7 01/15/2021    NEUTROABS 2.2 01/15/2021    HGB 16.3 01/15/2021    HCT 50.2 01/15/2021    MCV 92.3 01/15/2021     01/15/2021       Lab Results   Component Value Date    TSH 1.75 01/15/2021         ASSESSMENT/PLAN:     1. History of ETOH abuse  Last drink 10/2. Completed detox at home. Continue with AA meetings. Continue with gym, healthy diet, support system. Advised counseling as well. Follow-up as needed. No concerns at this time and proud of patient's progress. Pt appreciative too. 2. Anxiety  Continue hydroxyzine as directed. Discussed coping mechanisms. Education provided. See above. Follow-up as needed. No orders of the defined types were placed in this encounter.

## 2021-11-01 ASSESSMENT — ENCOUNTER SYMPTOMS: RESPIRATORY NEGATIVE: 1

## 2022-06-07 ENCOUNTER — OFFICE VISIT (OUTPATIENT)
Dept: PRIMARY CARE CLINIC | Age: 27
End: 2022-06-07
Payer: COMMERCIAL

## 2022-06-07 ENCOUNTER — HOSPITAL ENCOUNTER (OUTPATIENT)
Facility: HOSPITAL | Age: 27
Discharge: HOME | End: 2022-06-07
Payer: COMMERCIAL

## 2022-06-07 VITALS
BODY MASS INDEX: 33.2 KG/M2 | HEART RATE: 92 BPM | HEIGHT: 66 IN | TEMPERATURE: 98.9 F | DIASTOLIC BLOOD PRESSURE: 81 MMHG | SYSTOLIC BLOOD PRESSURE: 135 MMHG | OXYGEN SATURATION: 98 % | WEIGHT: 206.6 LBS

## 2022-06-07 DIAGNOSIS — F10.10 ALCOHOL ABUSE: Primary | ICD-10-CM

## 2022-06-07 DIAGNOSIS — F10.10 ALCOHOL ABUSE: ICD-10-CM

## 2022-06-07 DIAGNOSIS — J02.9 SORE THROAT: ICD-10-CM

## 2022-06-07 DIAGNOSIS — R42 DIZZINESS, NONSPECIFIC: ICD-10-CM

## 2022-06-07 DIAGNOSIS — F41.9 ANXIETY: ICD-10-CM

## 2022-06-07 DIAGNOSIS — R03.0 ELEVATED BLOOD PRESSURE READING WITHOUT DIAGNOSIS OF HYPERTENSION: ICD-10-CM

## 2022-06-07 DIAGNOSIS — L60.0 INGROWN TOENAIL OF LEFT FOOT: ICD-10-CM

## 2022-06-07 PROCEDURE — 36415 COLL VENOUS BLD VENIPUNCTURE: CPT

## 2022-06-07 PROCEDURE — 84443 ASSAY THYROID STIM HORMONE: CPT

## 2022-06-07 PROCEDURE — 82607 VITAMIN B-12: CPT

## 2022-06-07 PROCEDURE — 82746 ASSAY OF FOLIC ACID SERUM: CPT

## 2022-06-07 PROCEDURE — 80053 COMPREHEN METABOLIC PANEL: CPT

## 2022-06-07 PROCEDURE — 99214 OFFICE O/P EST MOD 30 MIN: CPT | Performed by: PHYSICIAN ASSISTANT

## 2022-06-07 PROCEDURE — 82306 VITAMIN D 25 HYDROXY: CPT

## 2022-06-07 PROCEDURE — 83036 HEMOGLOBIN GLYCOSYLATED A1C: CPT

## 2022-06-07 PROCEDURE — 85025 COMPLETE CBC W/AUTO DIFF WBC: CPT

## 2022-06-07 RX ORDER — CEPHALEXIN 500 MG/1
500 CAPSULE ORAL 2 TIMES DAILY
Qty: 14 CAPSULE | Refills: 0 | Status: SHIPPED | OUTPATIENT
Start: 2022-06-07 | End: 2022-06-14

## 2022-06-07 RX ORDER — BUSPIRONE HYDROCHLORIDE 10 MG/1
10 TABLET ORAL 3 TIMES DAILY
Qty: 90 TABLET | Refills: 2 | Status: SHIPPED | OUTPATIENT
Start: 2022-06-07 | End: 2022-07-07

## 2022-06-07 SDOH — ECONOMIC STABILITY: FOOD INSECURITY: WITHIN THE PAST 12 MONTHS, YOU WORRIED THAT YOUR FOOD WOULD RUN OUT BEFORE YOU GOT MONEY TO BUY MORE.: NEVER TRUE

## 2022-06-07 SDOH — ECONOMIC STABILITY: FOOD INSECURITY: WITHIN THE PAST 12 MONTHS, THE FOOD YOU BOUGHT JUST DIDN'T LAST AND YOU DIDN'T HAVE MONEY TO GET MORE.: NEVER TRUE

## 2022-06-07 ASSESSMENT — LIFESTYLE VARIABLES
HOW OFTEN DO YOU HAVE A DRINK CONTAINING ALCOHOL: 4 OR MORE TIMES A WEEK
HOW MANY STANDARD DRINKS CONTAINING ALCOHOL DO YOU HAVE ON A TYPICAL DAY: 1 OR 2

## 2022-06-07 ASSESSMENT — ENCOUNTER SYMPTOMS
SHORTNESS OF BREATH: 0
DIARRHEA: 0
ABDOMINAL PAIN: 0
EYE PAIN: 0
SORE THROAT: 0
CONSTIPATION: 0
COUGH: 0

## 2022-06-07 ASSESSMENT — PATIENT HEALTH QUESTIONNAIRE - PHQ9
SUM OF ALL RESPONSES TO PHQ QUESTIONS 1-9: 0
2. FEELING DOWN, DEPRESSED OR HOPELESS: 0
SUM OF ALL RESPONSES TO PHQ QUESTIONS 1-9: 0
1. LITTLE INTEREST OR PLEASURE IN DOING THINGS: 0
SUM OF ALL RESPONSES TO PHQ9 QUESTIONS 1 & 2: 0

## 2022-06-07 ASSESSMENT — SOCIAL DETERMINANTS OF HEALTH (SDOH): HOW HARD IS IT FOR YOU TO PAY FOR THE VERY BASICS LIKE FOOD, HOUSING, MEDICAL CARE, AND HEATING?: NOT HARD AT ALL

## 2022-06-07 NOTE — PROGRESS NOTES
Chief Complaint   Patient presents with    Check-Up    Ingrown Toenail     left foot, big toe    Dizziness       Have you seen any other physician or provider since your last visit no    Have you had any other diagnostic tests since your last visit? no    Have you changed or stopped any medications since your last visit? no

## 2022-06-07 NOTE — PROGRESS NOTES
Juan J Mccray (:  1995) is a 24 y.o. male,New patient, here for evaluation of the following chief complaint(s):  Check-Up, Ingrown Toenail (left foot, big toe), and Dizziness           Subjective   SUBJECTIVE/OBJECTIVE:  HPI   Mr. Cezar Sauceda is a 24year old male with PMH ETOH abuse who presents to establish care. He complains of 3 week history left big toe pain and drainage and swelling. He has been using peroxide and antibiotic ointment. He complains of ongoing dizziness and anxiety. He sees the nurse at his job site and has had his sugar checked often. He reports normal glucose that should be higher based on what he eats. He reports restlessness in his left hand and leg. He continues to drink a beer daily since  2021; sometimes more if he doesn't work the next day. He has been at Detox center in past twice when he was drinking more heavily. 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 He has taken Vistaril in the past but it makes him too sleepy. He relates his elevation in blood pressure to drinking. Past Medical History:   Diagnosis Date    ADD (attention deficit disorder)     Allergic rhinitis     Anxiety     Depression     Hypertension     OCD (obsessive compulsive disorder)     PTSD (post-traumatic stress disorder)       Past Surgical History:   Procedure Laterality Date    KNEE ARTHROSCOPY Left         Review of Systems   Constitutional: Negative for chills, fatigue and fever. HENT: Negative for congestion, ear pain, nosebleeds and sore throat. Eyes: Negative for pain and visual disturbance. Respiratory: Negative for cough and shortness of breath. Cardiovascular: Negative for chest pain and palpitations. Gastrointestinal: Negative for abdominal pain, constipation and diarrhea. Genitourinary: Negative for difficulty urinating and flank pain. Musculoskeletal: Negative for arthralgias, gait problem and myalgias. Skin: Positive for wound (left big toe). Negative for rash. Neurological: Negative for syncope, light-headedness and headaches. Psychiatric/Behavioral: Negative for behavioral problems, confusion and dysphoric mood. The patient is nervous/anxious. Vitals:    06/07/22 1417 06/07/22 1425   BP: (!) 147/86 135/81   Site: Left Upper Arm Right Upper Arm   Position: Sitting Sitting   Pulse: 92    Temp: 98.9 °F (37.2 °C)    TempSrc: Temporal    SpO2: 98%    Weight: 206 lb 9.6 oz (93.7 kg)    Height: 5' 6\" (1.676 m)      Physical Exam  Vitals reviewed. Constitutional:       Appearance: Normal appearance. HENT:      Head: Normocephalic and atraumatic. Right Ear: Tympanic membrane, ear canal and external ear normal.      Left Ear: Tympanic membrane, ear canal and external ear normal.      Nose: Nose normal.      Mouth/Throat:      Mouth: Mucous membranes are moist.      Pharynx: Oropharynx is clear. Eyes:      Extraocular Movements: Extraocular movements intact. Conjunctiva/sclera: Conjunctivae normal.      Pupils: Pupils are equal, round, and reactive to light. Neck:      Vascular: No carotid bruit. Cardiovascular:      Rate and Rhythm: Normal rate and regular rhythm. Pulses: Normal pulses. Heart sounds: Normal heart sounds. Pulmonary:      Effort: Pulmonary effort is normal.      Breath sounds: Normal breath sounds. Abdominal:      General: Bowel sounds are normal. There is no distension. Palpations: Abdomen is soft. There is no mass. Tenderness: There is no abdominal tenderness. Musculoskeletal:         General: Normal range of motion. Cervical back: Normal range of motion. Feet:      Left foot:      Skin integrity: Erythema present. Toenail Condition: Left toenails are ingrown. Skin:     General: Skin is warm. Findings: No rash.    Neurological:      General: No focal deficit present. Mental Status: He is alert. Psychiatric:         Mood and Affect: Mood normal.         Thought Content: Thought content normal.                 ASSESSMENT/PLAN:  1. Alcohol abuse  We discussed one drink a day was not ideal and he needs to consider slowly weaning and detox program.  - CBC with Auto Differential; Future  - Comprehensive Metabolic Panel; Future    2. Anxiety  With Panic Disorder  Failed unknown SSRI due to side effects of weakness and thoughts of death  - TSH; Future  - busPIRone (BUSPAR) 10 MG tablet; Take 1 tablet by mouth 3 times daily  Dispense: 90 tablet; Refill: 2    3. Elevated blood pressure reading without diagnosis of hypertension  Monitor close; DASH Diet    4. Dizziness, nonspecific  - Vitamin B12 & Folate; Future  - Vitamin D 25 Hydroxy; Future  - Hemoglobin A1C; Future    5. Ingrown toenail of left foot  Pt will call Podiatrist Dr. Prasad Chapa as toenail needs excised; I advised Epson salt soaks BID  - cephALEXin (KEFLEX) 500 MG capsule; Take 1 capsule by mouth 2 times daily for 7 days  Dispense: 14 capsule; Refill: 0  - mupirocin (BACTROBAN) 2 % ointment; Apply topically 3 times daily. Dispense: 30 g; Refill: 2    6. Sore throat  PND vs GERD  I advised OTC Claritin and Pepcid        Return in about 2 months (around 8/7/2022). An electronic signature was used to authenticate this note.     --Kennedy Elliott PA-C

## 2022-06-08 LAB
A/G RATIO: 1.9 (ref 0.8–2)
ALBUMIN SERPL-MCNC: 5.2 G/DL (ref 3.4–4.8)
ALP BLD-CCNC: 70 U/L (ref 25–100)
ALT SERPL-CCNC: 16 U/L (ref 4–36)
ANION GAP SERPL CALCULATED.3IONS-SCNC: 12 MMOL/L (ref 3–16)
AST SERPL-CCNC: 19 U/L (ref 8–33)
BASOPHILS ABSOLUTE: 0.1 K/UL (ref 0–0.1)
BASOPHILS RELATIVE PERCENT: 1.1 %
BILIRUB SERPL-MCNC: 0.6 MG/DL (ref 0.3–1.2)
BUN BLDV-MCNC: 14 MG/DL (ref 6–20)
CALCIUM SERPL-MCNC: 10.2 MG/DL (ref 8.5–10.5)
CHLORIDE BLD-SCNC: 100 MMOL/L (ref 98–107)
CO2: 26 MMOL/L (ref 20–30)
CREAT SERPL-MCNC: 1 MG/DL (ref 0.4–1.2)
EOSINOPHILS ABSOLUTE: 0.6 K/UL (ref 0–0.4)
EOSINOPHILS RELATIVE PERCENT: 7.1 %
FOLATE: 13.54 NG/ML
GFR AFRICAN AMERICAN: >59
GFR NON-AFRICAN AMERICAN: >59
GLOBULIN: 2.8 G/DL
GLUCOSE BLD-MCNC: 85 MG/DL (ref 74–106)
HBA1C MFR BLD: 5.2 %
HCT VFR BLD CALC: 49.4 % (ref 40–54)
HEMOGLOBIN: 16.6 G/DL (ref 13–18)
IMMATURE GRANULOCYTES #: 0 K/UL
IMMATURE GRANULOCYTES %: 0.3 % (ref 0–5)
LYMPHOCYTES ABSOLUTE: 2.3 K/UL (ref 1.5–4)
LYMPHOCYTES RELATIVE PERCENT: 26 %
MCH RBC QN AUTO: 29.7 PG (ref 27–32)
MCHC RBC AUTO-ENTMCNC: 33.6 G/DL (ref 31–35)
MCV RBC AUTO: 88.5 FL (ref 80–100)
MONOCYTES ABSOLUTE: 0.7 K/UL (ref 0.2–0.8)
MONOCYTES RELATIVE PERCENT: 7.8 %
NEUTROPHILS ABSOLUTE: 5.1 K/UL (ref 2–7.5)
NEUTROPHILS RELATIVE PERCENT: 57.7 %
PDW BLD-RTO: 11.9 % (ref 11–16)
PLATELET # BLD: 289 K/UL (ref 150–400)
PMV BLD AUTO: 11.8 FL (ref 6–10)
POTASSIUM SERPL-SCNC: 4.3 MMOL/L (ref 3.4–5.1)
RBC # BLD: 5.58 M/UL (ref 4.5–6)
SODIUM BLD-SCNC: 138 MMOL/L (ref 136–145)
TOTAL PROTEIN: 8 G/DL (ref 6.4–8.3)
TSH SERPL DL<=0.05 MIU/L-ACNC: 3.88 UIU/ML (ref 0.27–4.2)
VITAMIN B-12: 501 PG/ML (ref 211–911)
VITAMIN D 25-HYDROXY: 36.1 (ref 32–100)
WBC # BLD: 8.9 K/UL (ref 4–11)

## 2022-06-09 ENCOUNTER — TELEPHONE (OUTPATIENT)
Dept: PRIMARY CARE CLINIC | Age: 27
End: 2022-06-09

## 2022-09-24 ENCOUNTER — OFFICE VISIT (OUTPATIENT)
Dept: PRIMARY CARE CLINIC | Age: 27
End: 2022-09-24

## 2022-09-24 VITALS
TEMPERATURE: 97.9 F | OXYGEN SATURATION: 100 % | SYSTOLIC BLOOD PRESSURE: 133 MMHG | HEART RATE: 74 BPM | RESPIRATION RATE: 13 BRPM | DIASTOLIC BLOOD PRESSURE: 91 MMHG | WEIGHT: 208 LBS | BODY MASS INDEX: 33.57 KG/M2

## 2022-09-24 DIAGNOSIS — N50.89 SWOLLEN TESTICLE: Primary | ICD-10-CM

## 2022-09-24 RX ORDER — CEPHALEXIN 500 MG/1
500 CAPSULE ORAL 2 TIMES DAILY
COMMUNITY
Start: 2022-08-26 | End: 2022-10-03

## 2022-09-24 ASSESSMENT — ENCOUNTER SYMPTOMS
GASTROINTESTINAL NEGATIVE: 1
RESPIRATORY NEGATIVE: 1
EYES NEGATIVE: 1

## 2022-09-24 NOTE — PROGRESS NOTES
SUBJECTIVE:    Patient ID: Aurora Setting is a 27 y.o.male. Chief Complaint   Patient presents with    Groin Swelling       HPI:  25 yo male with complaints of right testicle swollen. Denies any pain. Started about two days ago. Increased lifting at work recently. Denies any new sexual partners. Patient's medications, allergies, past medical, surgical, social and family histories were reviewed and updated as appropriate in electronic medical record. Current Outpatient Medications on File Prior to Visit   Medication Sig Dispense Refill    cephALEXin (KEFLEX) 500 MG capsule Take 500 mg by mouth in the morning and at bedtime       No current facility-administered medications on file prior to visit. Review of Systems   Constitutional: Negative. HENT: Negative. Eyes: Negative. Respiratory: Negative. Cardiovascular: Negative. Gastrointestinal: Negative. Endocrine: Negative. Genitourinary:  Positive for scrotal swelling. Negative for testicular pain. Musculoskeletal: Negative. Neurological: Negative. Hematological: Negative. Psychiatric/Behavioral: Negative.        Past Medical History:   Diagnosis Date    ADD (attention deficit disorder)     Allergic rhinitis     Anxiety     Depression     Hypertension     OCD (obsessive compulsive disorder)     PTSD (post-traumatic stress disorder)      Past Surgical History:   Procedure Laterality Date    KNEE ARTHROSCOPY Left      Family History   Problem Relation Age of Onset    No Known Problems Mother     No Known Problems Father       Social History     Tobacco Use   Smoking Status Never   Smokeless Tobacco Current    Types: Snuff   Tobacco Comments    Dip       OBJECTIVE:   Wt Readings from Last 3 Encounters:   09/24/22 208 lb (94.3 kg)   06/07/22 206 lb 9.6 oz (93.7 kg)   10/21/21 207 lb (93.9 kg)     BP Readings from Last 3 Encounters:   09/24/22 (!) 133/91   06/07/22 135/81   10/21/21 127/76       BP (!) 133/91 (Site: Right Upper Arm, Position: Sitting, Cuff Size: Medium Adult)   Pulse 74   Temp 97.9 °F (36.6 °C) (Temporal)   Resp 13   Wt 208 lb (94.3 kg)   SpO2 100%   BMI 33.57 kg/m²    Physical Exam  Constitutional:       Appearance: Normal appearance. HENT:      Head: Normocephalic and atraumatic. Nose: Nose normal.      Mouth/Throat:      Mouth: Mucous membranes are moist.      Pharynx: Oropharynx is clear. Eyes:      Conjunctiva/sclera: Conjunctivae normal.      Pupils: Pupils are equal, round, and reactive to light. Cardiovascular:      Rate and Rhythm: Normal rate and regular rhythm. Pulmonary:      Effort: Pulmonary effort is normal.      Breath sounds: Normal breath sounds. Abdominal:      General: Abdomen is flat. Palpations: Abdomen is soft. Genitourinary:     Penis: Normal.       Comments: Right testicle swollen, nontender, no masses or lesions palpated. Musculoskeletal:         General: Normal range of motion. Cervical back: Normal range of motion and neck supple. Skin:     General: Skin is warm and dry. Neurological:      General: No focal deficit present. Mental Status: He is alert and oriented to person, place, and time.    Psychiatric:         Behavior: Behavior normal.       Lab Results   Component Value Date/Time     06/07/2022 03:04 PM    K 4.3 06/07/2022 03:04 PM     06/07/2022 03:04 PM    CO2 26 06/07/2022 03:04 PM    GLUCOSE 85 06/07/2022 03:04 PM    BUN 14 06/07/2022 03:04 PM    CREATININE 1.0 06/07/2022 03:04 PM    CALCIUM 10.2 06/07/2022 03:04 PM    PROT 8.0 06/07/2022 03:04 PM    LABALBU 5.2 06/07/2022 03:04 PM    BILITOT 0.6 06/07/2022 03:04 PM    ALT 16 06/07/2022 03:04 PM    AST 19 06/07/2022 03:04 PM     Hemoglobin A1C (%)   Date Value   06/07/2022 5.2     LDL Calculated (mg/dL)   Date Value   01/15/2021 110       Lab Results   Component Value Date/Time    WBC 8.9 06/07/2022 03:04 PM    NEUTROABS 5.1 06/07/2022 03:04 PM    HGB 16.6 06/07/2022 03:04 PM    HCT 49.4 06/07/2022 03:04 PM    MCV 88.5 06/07/2022 03:04 PM     06/07/2022 03:04 PM     Lab Results   Component Value Date    TSH 3.88 06/07/2022       ASSESSMENT/PLAN:     1. Swollen testicle  US scheduled. Advised patient someone will notify him of results and pending those results as to what will happen next. Discussed s/s of testicular cancer and is not likely at this point.    - [address]; Future      No orders of the defined types were placed in this encounter.        Electronically signed by SADIE Faith CNP on 9/24/2022 at 3:03 PM

## 2022-09-26 ENCOUNTER — TELEPHONE (OUTPATIENT)
Dept: PRIMARY CARE CLINIC | Age: 27
End: 2022-09-26

## 2022-09-26 ENCOUNTER — HOSPITAL ENCOUNTER (OUTPATIENT)
Dept: ULTRASOUND IMAGING | Facility: HOSPITAL | Age: 27
Discharge: HOME | End: 2022-09-26
Payer: COMMERCIAL

## 2022-09-26 DIAGNOSIS — N50.89 SWOLLEN TESTICLE: ICD-10-CM

## 2022-09-26 DIAGNOSIS — R93.89 ABNORMAL ULTRASOUND: Primary | ICD-10-CM

## 2022-09-26 PROCEDURE — 76870 US EXAM SCROTUM: CPT

## 2022-09-27 NOTE — TELEPHONE ENCOUNTER
Called patient and left message that he needed an appointment and we have placed a urology referral.

## 2022-10-03 ENCOUNTER — OFFICE VISIT (OUTPATIENT)
Dept: PRIMARY CARE CLINIC | Age: 27
End: 2022-10-03
Payer: COMMERCIAL

## 2022-10-03 ENCOUNTER — HOSPITAL ENCOUNTER (OUTPATIENT)
Facility: HOSPITAL | Age: 27
Discharge: HOME | End: 2022-10-03
Payer: COMMERCIAL

## 2022-10-03 VITALS
OXYGEN SATURATION: 98 % | HEIGHT: 66 IN | DIASTOLIC BLOOD PRESSURE: 84 MMHG | HEART RATE: 94 BPM | SYSTOLIC BLOOD PRESSURE: 133 MMHG | WEIGHT: 207.2 LBS | TEMPERATURE: 97 F | BODY MASS INDEX: 33.3 KG/M2

## 2022-10-03 DIAGNOSIS — N45.1 EPIDIDYMITIS: ICD-10-CM

## 2022-10-03 DIAGNOSIS — I86.1 RIGHT VARICOCELE: ICD-10-CM

## 2022-10-03 DIAGNOSIS — N45.1 EPIDIDYMITIS: Primary | ICD-10-CM

## 2022-10-03 DIAGNOSIS — R10.31 RIGHT INGUINAL PAIN: ICD-10-CM

## 2022-10-03 PROCEDURE — 87491 CHLMYD TRACH DNA AMP PROBE: CPT

## 2022-10-03 PROCEDURE — 87591 N.GONORRHOEAE DNA AMP PROB: CPT

## 2022-10-03 PROCEDURE — 99213 OFFICE O/P EST LOW 20 MIN: CPT | Performed by: PHYSICIAN ASSISTANT

## 2022-10-03 ASSESSMENT — ENCOUNTER SYMPTOMS
SHORTNESS OF BREATH: 0
CONSTIPATION: 0
DIARRHEA: 0
EYE PAIN: 0
SORE THROAT: 0
COUGH: 0
ABDOMINAL PAIN: 0

## 2022-10-03 NOTE — PROGRESS NOTES
Chief Complaint   Patient presents with    Follow-up    Testicle Pain       Have you seen any other physician or provider since your last visit Yes- weekend same day    Have you had any other diagnostic tests since your last visit?  yes - US    Have you changed or stopped any medications since your last visit? no

## 2022-10-04 LAB
C. TRACHOMATIS DNA ,URINE: NEGATIVE
N. GONORRHOEAE DNA, URINE: NEGATIVE

## 2023-03-02 ENCOUNTER — OFFICE VISIT (OUTPATIENT)
Dept: PRIMARY CARE CLINIC | Age: 28
End: 2023-03-02
Payer: COMMERCIAL

## 2023-03-02 VITALS — TEMPERATURE: 97.8 F | HEART RATE: 110 BPM | OXYGEN SATURATION: 98 %

## 2023-03-02 DIAGNOSIS — R05.9 COUGH, UNSPECIFIED TYPE: ICD-10-CM

## 2023-03-02 DIAGNOSIS — J06.9 UPPER RESPIRATORY TRACT INFECTION, UNSPECIFIED TYPE: Primary | ICD-10-CM

## 2023-03-02 LAB
INFLUENZA A ANTIBODY: NORMAL
INFLUENZA B ANTIBODY: NORMAL
Lab: NORMAL
QC PASS/FAIL: NORMAL
SARS-COV-2 RDRP RESP QL NAA+PROBE: NEGATIVE

## 2023-03-02 PROCEDURE — 87804 INFLUENZA ASSAY W/OPTIC: CPT | Performed by: NURSE PRACTITIONER

## 2023-03-02 PROCEDURE — 87635 SARS-COV-2 COVID-19 AMP PRB: CPT | Performed by: NURSE PRACTITIONER

## 2023-03-02 PROCEDURE — 99212 OFFICE O/P EST SF 10 MIN: CPT | Performed by: NURSE PRACTITIONER

## 2023-03-02 RX ORDER — GUAIFENESIN 600 MG/1
600 TABLET, EXTENDED RELEASE ORAL 2 TIMES DAILY
Qty: 30 TABLET | Refills: 0 | Status: SHIPPED | OUTPATIENT
Start: 2023-03-02 | End: 2023-03-17

## 2023-03-02 RX ORDER — AZITHROMYCIN 250 MG/1
250 TABLET, FILM COATED ORAL SEE ADMIN INSTRUCTIONS
Qty: 6 TABLET | Refills: 0 | Status: SHIPPED | OUTPATIENT
Start: 2023-03-02 | End: 2023-03-07

## 2023-03-02 ASSESSMENT — PATIENT HEALTH QUESTIONNAIRE - PHQ9: DEPRESSION UNABLE TO ASSESS: URGENT/EMERGENT SITUATION

## 2023-03-02 NOTE — PROGRESS NOTES
Chief Complaint   Patient presents with    Cough    Congestion    Pharyngitis     X 2 days, unknown if he had a fever.  Patient has some congestion in his chest.

## 2023-03-02 NOTE — LETTER
March 2, 2023       Lida Kc YOB: 1995   [address] Date of Visit:  3/2/2023       To Whom It May Concern:    Seen in clinic. Please excuse 3/2. If you have any questions or concerns, please don't hesitate to call.     Sincerely,        Carlos Marcos, APRN - CNP

## 2023-03-02 NOTE — PROGRESS NOTES
SUBJECTIVE:    Patient ID: Deepti Irizarry is a 27 y.o.male. Chief Complaint   Patient presents with    Cough    Congestion    Pharyngitis         HPI:    Pt c/o cough and congestion few days. Other S&S sore throat, fatigue. Unknown sick contacts. No CP, SOB. Congested cough. No fever. No relieving factors. Worse when lying flat. No treatments. Patient's medications, allergies, past medical, surgical, social and family histories were reviewed and updated as appropriate in electronic medical record. No outpatient medications have been marked as taking for the 3/2/23 encounter (Office Visit) with SADIE Tran CNP. Review of Systems   Constitutional:  Positive for fatigue. Negative for chills, diaphoresis and fever. HENT:  Positive for congestion, postnasal drip, sinus pressure, sneezing and sore throat. Negative for ear pain. Respiratory:  Positive for cough. Cardiovascular: Negative. Gastrointestinal: Negative. Musculoskeletal: Negative. Allergic/Immunologic: Positive for environmental allergies.      Past Medical History:   Diagnosis Date    ADD (attention deficit disorder)     Allergic rhinitis     Anxiety     Depression     Hypertension     OCD (obsessive compulsive disorder)     PTSD (post-traumatic stress disorder)      Past Surgical History:   Procedure Laterality Date    KNEE ARTHROSCOPY Left      Family History   Problem Relation Age of Onset    No Known Problems Mother     No Known Problems Father       Social History     Tobacco Use   Smoking Status Never   Smokeless Tobacco Current    Types: Snuff   Tobacco Comments    Dip       OBJECTIVE:   Wt Readings from Last 3 Encounters:   10/03/22 207 lb 3.2 oz (94 kg)   09/24/22 208 lb (94.3 kg)   06/07/22 206 lb 9.6 oz (93.7 kg)     BP Readings from Last 3 Encounters:   10/03/22 133/84   09/24/22 (!) 133/91   06/07/22 135/81       Pulse (!) 110   Temp 97.8 °F (36.6 °C)   SpO2 98%      Physical Exam  Vitals and nursing note reviewed. Constitutional:       Appearance: He is well-developed. HENT:      Head: Normocephalic. Right Ear: External ear normal.      Left Ear: External ear normal.      Nose: Congestion present. Mouth/Throat:      Mouth: Mucous membranes are moist.      Pharynx: Oropharynx is clear. Posterior oropharyngeal erythema present. Eyes:      Conjunctiva/sclera: Conjunctivae normal.   Cardiovascular:      Rate and Rhythm: Regular rhythm. Tachycardia present. Pulmonary:      Effort: Pulmonary effort is normal. No respiratory distress. Breath sounds: No wheezing. Comments: congested cough. NAD. Musculoskeletal:      Cervical back: Normal range of motion and neck supple. Lymphadenopathy:      Cervical: No cervical adenopathy. Skin:     Capillary Refill: Capillary refill takes less than 2 seconds. Neurological:      Mental Status: He is alert and oriented to person, place, and time. Psychiatric:         Mood and Affect: Mood normal.         Behavior: Behavior normal.       Lab Results   Component Value Date/Time     06/07/2022 03:04 PM    K 4.3 06/07/2022 03:04 PM     06/07/2022 03:04 PM    CO2 26 06/07/2022 03:04 PM    GLUCOSE 85 06/07/2022 03:04 PM    BUN 14 06/07/2022 03:04 PM    CREATININE 1.0 06/07/2022 03:04 PM    CALCIUM 10.2 06/07/2022 03:04 PM    PROT 8.0 06/07/2022 03:04 PM    LABALBU 5.2 06/07/2022 03:04 PM    BILITOT 0.6 06/07/2022 03:04 PM    ALT 16 06/07/2022 03:04 PM    AST 19 06/07/2022 03:04 PM       Hemoglobin A1C (%)   Date Value   06/07/2022 5.2     LDL Calculated (mg/dL)   Date Value   01/15/2021 110         Lab Results   Component Value Date/Time    WBC 8.9 06/07/2022 03:04 PM    NEUTROABS 5.1 06/07/2022 03:04 PM    HGB 16.6 06/07/2022 03:04 PM    HCT 49.4 06/07/2022 03:04 PM    MCV 88.5 06/07/2022 03:04 PM     06/07/2022 03:04 PM       Lab Results   Component Value Date    TSH 3.88 06/07/2022         ASSESSMENT/PLAN:     1.  Upper respiratory tract infection, unspecified type  New, acute. Covid, flu neg. Take medications as prescribed. Continue home medications. Discussed symptom management. Return to clinic S&S worsen or no improvement noted. Patient verbalized understanding. 2. Cough, unspecified type  Covid, flu neg.  See 1  - POCT COVID-19 Rapid, NAAT  - POCT Influenza A/B        Orders Placed This Encounter   Medications    azithromycin (ZITHROMAX) 250 MG tablet     Sig: Take 1 tablet by mouth See Admin Instructions for 5 days 500mg on day 1 followed by 250mg on days 2 - 5     Dispense:  6 tablet     Refill:  0    guaiFENesin (MUCINEX) 600 MG extended release tablet     Sig: Take 1 tablet by mouth 2 times daily for 15 days     Dispense:  30 tablet     Refill:  0

## 2023-03-06 ENCOUNTER — OFFICE VISIT (OUTPATIENT)
Dept: PRIMARY CARE CLINIC | Age: 28
End: 2023-03-06
Payer: COMMERCIAL

## 2023-03-06 VITALS — TEMPERATURE: 98.7 F | OXYGEN SATURATION: 96 % | HEART RATE: 103 BPM

## 2023-03-06 DIAGNOSIS — J20.9 ACUTE BRONCHITIS, UNSPECIFIED ORGANISM: Primary | ICD-10-CM

## 2023-03-06 DIAGNOSIS — J06.9 UPPER RESPIRATORY TRACT INFECTION, UNSPECIFIED TYPE: ICD-10-CM

## 2023-03-06 DIAGNOSIS — R05.9 COUGH, UNSPECIFIED TYPE: ICD-10-CM

## 2023-03-06 LAB
Lab: NORMAL
QC PASS/FAIL: NORMAL
SARS-COV-2 RDRP RESP QL NAA+PROBE: NEGATIVE

## 2023-03-06 PROCEDURE — 87635 SARS-COV-2 COVID-19 AMP PRB: CPT | Performed by: NURSE PRACTITIONER

## 2023-03-06 PROCEDURE — 99213 OFFICE O/P EST LOW 20 MIN: CPT | Performed by: NURSE PRACTITIONER

## 2023-03-06 RX ORDER — AMOXICILLIN AND CLAVULANATE POTASSIUM 875; 125 MG/1; MG/1
1 TABLET, FILM COATED ORAL 2 TIMES DAILY
Qty: 20 TABLET | Refills: 0 | Status: SHIPPED | OUTPATIENT
Start: 2023-03-06 | End: 2023-03-16

## 2023-03-06 RX ORDER — PREDNISONE 10 MG/1
10 TABLET ORAL 2 TIMES DAILY
Qty: 10 TABLET | Refills: 0 | Status: SHIPPED | OUTPATIENT
Start: 2023-03-06 | End: 2023-03-11

## 2023-03-06 NOTE — PROGRESS NOTES
Chief Complaint   Patient presents with    Cough     X 5 days, patient has a productive cough with green sputum. He did two home covid test and they were negative. Patient also has loss of taste and smell.

## 2023-03-06 NOTE — PROGRESS NOTES
SUBJECTIVE:    Patient ID: Sylvia Mora is a 27 y.o.male. Chief Complaint   Patient presents with    Cough     X 5 days, patient has a productive cough with green sputum. He did two home covid test and they were negative. Patient also has loss of taste and smell. HPI:    Pt presents to clinic for c/o persistent bothersome cough 5 days. Recently seen in clinic 4 days ago on 3/2 diagnosed URI and prescribed zpak, mucinex and symptom management. Pt reports taking med and not helpful. C/o sneezing, congestion, postnasal drainage and productive cough green sputum. No CP, SOB. Lying flat makes it worse. No relieving factors. No other treatments. Patient's medications, allergies, past medical, surgical, social and family histories were reviewed and updated as appropriate in electronic medical record. Outpatient Medications Marked as Taking for the 3/6/23 encounter (Office Visit) with SADIE Plummer CNP   Medication Sig Dispense Refill    azithromycin (ZITHROMAX) 250 MG tablet Take 1 tablet by mouth See Admin Instructions for 5 days 500mg on day 1 followed by 250mg on days 2 - 5 6 tablet 0    guaiFENesin (MUCINEX) 600 MG extended release tablet Take 1 tablet by mouth 2 times daily for 15 days 30 tablet 0        Review of Systems   Constitutional:  Positive for fatigue. HENT:  Positive for congestion, postnasal drip, sinus pressure, sneezing and sore throat. Respiratory:  Positive for cough. All other systems reviewed and are negative.     Past Medical History:   Diagnosis Date    ADD (attention deficit disorder)     Allergic rhinitis     Anxiety     Depression     Hypertension     OCD (obsessive compulsive disorder)     PTSD (post-traumatic stress disorder)      Past Surgical History:   Procedure Laterality Date    KNEE ARTHROSCOPY Left      Family History   Problem Relation Age of Onset    No Known Problems Mother     No Known Problems Father       Social History Tobacco Use   Smoking Status Never   Smokeless Tobacco Current    Types: Snuff   Tobacco Comments    Dip       OBJECTIVE:   Wt Readings from Last 3 Encounters:   10/03/22 207 lb 3.2 oz (94 kg)   09/24/22 208 lb (94.3 kg)   06/07/22 206 lb 9.6 oz (93.7 kg)     BP Readings from Last 3 Encounters:   10/03/22 133/84   09/24/22 (!) 133/91   06/07/22 135/81       Pulse (!) 103   Temp 98.7 °F (37.1 °C)   SpO2 96%      Physical Exam  Vitals and nursing note reviewed. Constitutional:       Appearance: He is well-developed. HENT:      Head: Normocephalic. Right Ear: External ear normal.      Left Ear: External ear normal.      Nose: Congestion present. Mouth/Throat:      Mouth: Mucous membranes are moist.      Pharynx: Oropharynx is clear. Posterior oropharyngeal erythema present. Eyes:      Conjunctiva/sclera: Conjunctivae normal.   Cardiovascular:      Rate and Rhythm: Normal rate and regular rhythm. Pulmonary:      Effort: Pulmonary effort is normal. No respiratory distress. Breath sounds: No wheezing, rhonchi or rales. Comments: congested cough. NAD. Musculoskeletal:      Cervical back: Normal range of motion and neck supple. Lymphadenopathy:      Cervical: No cervical adenopathy. Neurological:      Mental Status: He is alert and oriented to person, place, and time.    Psychiatric:         Mood and Affect: Mood normal.         Behavior: Behavior normal.       Lab Results   Component Value Date/Time     06/07/2022 03:04 PM    K 4.3 06/07/2022 03:04 PM     06/07/2022 03:04 PM    CO2 26 06/07/2022 03:04 PM    GLUCOSE 85 06/07/2022 03:04 PM    BUN 14 06/07/2022 03:04 PM    CREATININE 1.0 06/07/2022 03:04 PM    CALCIUM 10.2 06/07/2022 03:04 PM    PROT 8.0 06/07/2022 03:04 PM    LABALBU 5.2 06/07/2022 03:04 PM    BILITOT 0.6 06/07/2022 03:04 PM    ALT 16 06/07/2022 03:04 PM    AST 19 06/07/2022 03:04 PM       Hemoglobin A1C (%)   Date Value   06/07/2022 5.2     LDL Calculated (mg/dL)   Date Value   01/15/2021 110         Lab Results   Component Value Date/Time    WBC 8.9 06/07/2022 03:04 PM    NEUTROABS 5.1 06/07/2022 03:04 PM    HGB 16.6 06/07/2022 03:04 PM    HCT 49.4 06/07/2022 03:04 PM    MCV 88.5 06/07/2022 03:04 PM     06/07/2022 03:04 PM       Lab Results   Component Value Date    TSH 3.88 06/07/2022         ASSESSMENT/PLAN:     1. Acute bronchitis, unspecified organism  Acute, new. NAD. Discussed likely viral and antibiotics not effective if so. Take medications as prescribed. Continue home medications. Discussed symptom management. Return to clinic S&S worsen or no improvement noted. Patient verbalized understanding. 2. Cough, unspecified type  Covid neg.   - POCT COVID-19 Rapid, NAAT    3. Upper respiratory tract infection, unspecified type  Acute. NAD. See above.  F/u prn  - Iidxxosav-Wdzjkgcd-AQ 30-1-20 MG/5ML LIQD; Take 5mls every 6 hours as needed for cough  Dispense: 240 mL; Refill: 0        Orders Placed This Encounter   Medications    predniSONE (DELTASONE) 10 MG tablet     Sig: Take 1 tablet by mouth 2 times daily for 5 days     Dispense:  10 tablet     Refill:  0    Wxychfuvz-Rizeqdcg-OF 30-1-20 MG/5ML LIQD     Sig: Take 5mls every 6 hours as needed for cough     Dispense:  240 mL     Refill:  0    amoxicillin-clavulanate (AUGMENTIN) 875-125 MG per tablet     Sig: Take 1 tablet by mouth 2 times daily for 10 days     Dispense:  20 tablet     Refill:  0

## 2023-03-06 NOTE — LETTER
March 6, 2023       Sylvia Mora YOB: 1995   [address] Date of Visit:  3/6/2023       To Whom It May Concern:    Seen in clinic. Please excuse 3/6-3/7. If you have any questions or concerns, please don't hesitate to call.     Sincerely,        SADIE Gonzalez - CNP

## 2023-03-17 ASSESSMENT — ENCOUNTER SYMPTOMS
COUGH: 1
GASTROINTESTINAL NEGATIVE: 1
SINUS PRESSURE: 1
SORE THROAT: 1

## 2023-03-18 ASSESSMENT — ENCOUNTER SYMPTOMS
SORE THROAT: 1
SINUS PRESSURE: 1
COUGH: 1

## 2023-06-21 ENCOUNTER — HOSPITAL ENCOUNTER (OUTPATIENT)
Facility: HOSPITAL | Age: 28
Discharge: HOME | End: 2023-06-21

## 2023-06-21 ENCOUNTER — OFFICE VISIT (OUTPATIENT)
Dept: PRIMARY CARE CLINIC | Age: 28
End: 2023-06-21

## 2023-06-21 VITALS
DIASTOLIC BLOOD PRESSURE: 82 MMHG | WEIGHT: 202 LBS | OXYGEN SATURATION: 96 % | TEMPERATURE: 97.8 F | SYSTOLIC BLOOD PRESSURE: 118 MMHG | BODY MASS INDEX: 32.6 KG/M2 | HEART RATE: 73 BPM

## 2023-06-21 DIAGNOSIS — Z86.39 HISTORY OF HYPOKALEMIA: ICD-10-CM

## 2023-06-21 DIAGNOSIS — J01.40 ACUTE PANSINUSITIS, RECURRENCE NOT SPECIFIED: Primary | ICD-10-CM

## 2023-06-21 DIAGNOSIS — Z09 HOSPITAL DISCHARGE FOLLOW-UP: ICD-10-CM

## 2023-06-21 PROCEDURE — 83735 ASSAY OF MAGNESIUM: CPT

## 2023-06-21 PROCEDURE — 85027 COMPLETE CBC AUTOMATED: CPT

## 2023-06-21 PROCEDURE — 99213 OFFICE O/P EST LOW 20 MIN: CPT | Performed by: NURSE PRACTITIONER

## 2023-06-21 PROCEDURE — 80048 BASIC METABOLIC PNL TOTAL CA: CPT

## 2023-06-21 RX ORDER — FLUTICASONE PROPIONATE 50 MCG
1 SPRAY, SUSPENSION (ML) NASAL DAILY
Qty: 16 G | Refills: 0 | Status: SHIPPED | OUTPATIENT
Start: 2023-06-21

## 2023-06-21 RX ORDER — AMOXICILLIN AND CLAVULANATE POTASSIUM 875; 125 MG/1; MG/1
1 TABLET, FILM COATED ORAL 2 TIMES DAILY
Qty: 20 TABLET | Refills: 0 | Status: SHIPPED | OUTPATIENT
Start: 2023-06-21 | End: 2023-07-01

## 2023-06-21 NOTE — PROGRESS NOTES
Chief Complaint   Patient presents with    Abnormal Lab     Patient was seen in the ER and had some abnormal labs and was concerned. He had a spell while working out and had some facial numbness and hand numbness. I stated his hand aron up and he couldn't move. The spell last for around 20 minutes and then surpassed.

## 2023-06-21 NOTE — PROGRESS NOTES
SUBJECTIVE:    Patient ID: Jerardo Rai is a 34 y.o.male. Chief Complaint   Patient presents with    Abnormal Lab     Patient was seen in the ER and had some abnormal labs and was concerned. He had a spell while working out and had some facial numbness and hand numbness. I stated his hand aron up and he couldn't move. The spell last for around 20 minutes and then surpassed. HPI:    Patient presents to clinic for ER follow-up from 6/19 at [facility] for weakness, muscle spasms, facial numbness and left-sided facial weakness while working out. ER records reviewed in chart. Diagnosed hypokalemia, exertional rhabdo. Received electrolyte replacement, fluids as well as imaging of head/CT angiogram neck with no acute findings per radiology. Patient was hypertensive as well and improved throughout stay. Overall he is feeling better. He still has a lot of of facial pressure and started 1 month ago. No further muscle cramps, spasms, or facial numbness. Patient reports no further symptoms and will make sure he stays hydrated when working out and at work. Of note, CT angiogram head shows mucosal thickening of multiple sinuses which could explain his sinus pressure. Patient's medications, allergies, past medical, surgical, social and family histories were reviewed and updated as appropriate in electronic medical record. No outpatient medications have been marked as taking for the 6/21/23 encounter (Office Visit) with SADIE Mcmullen CNP. Review of Systems   Constitutional:  Positive for fatigue. HENT:  Positive for congestion, sinus pressure and sinus pain. All other systems reviewed and are negative.     Past Medical History:   Diagnosis Date    ADD (attention deficit disorder)     Allergic rhinitis     Anxiety     Depression     Hypertension     OCD (obsessive compulsive disorder)     PTSD (post-traumatic stress disorder)      Past Surgical History:   Procedure Laterality

## 2023-06-22 LAB
ANION GAP SERPL CALCULATED.3IONS-SCNC: 11 MMOL/L (ref 3–16)
BUN SERPL-MCNC: 13 MG/DL (ref 6–20)
CALCIUM SERPL-MCNC: 10.1 MG/DL (ref 8.5–10.5)
CHLORIDE SERPL-SCNC: 101 MMOL/L (ref 98–107)
CO2 SERPL-SCNC: 28 MMOL/L (ref 20–30)
CREAT SERPL-MCNC: 0.8 MG/DL (ref 0.4–1.2)
ERYTHROCYTE [DISTWIDTH] IN BLOOD BY AUTOMATED COUNT: 11.9 % (ref 11–16)
GFR SERPLBLD CREATININE-BSD FMLA CKD-EPI: >60 ML/MIN/{1.73_M2}
GLUCOSE SERPL-MCNC: 77 MG/DL (ref 74–106)
HCT VFR BLD AUTO: 45.9 % (ref 40–54)
HGB BLD-MCNC: 15.7 G/DL (ref 13–18)
MAGNESIUM SERPL-MCNC: 2.1 MG/DL (ref 1.7–2.4)
MCH RBC QN AUTO: 30.7 PG (ref 27–32)
MCHC RBC AUTO-ENTMCNC: 34.2 G/DL (ref 31–35)
MCV RBC AUTO: 89.6 FL (ref 80–100)
PLATELET # BLD AUTO: 261 K/UL (ref 150–400)
PMV BLD AUTO: 11.5 FL (ref 6–10)
POTASSIUM SERPL-SCNC: 4.5 MMOL/L (ref 3.4–5.1)
RBC # BLD AUTO: 5.12 M/UL (ref 4.5–6)
SODIUM SERPL-SCNC: 140 MMOL/L (ref 136–145)
WBC # BLD AUTO: 6.6 K/UL (ref 4–11)

## 2023-06-22 ASSESSMENT — ENCOUNTER SYMPTOMS
SINUS PAIN: 1
SINUS PRESSURE: 1

## 2023-07-15 ENCOUNTER — OFFICE VISIT (OUTPATIENT)
Dept: PRIMARY CARE CLINIC | Age: 28
End: 2023-07-15

## 2023-07-15 VITALS
TEMPERATURE: 98 F | HEART RATE: 74 BPM | WEIGHT: 198 LBS | HEIGHT: 65 IN | BODY MASS INDEX: 32.99 KG/M2 | DIASTOLIC BLOOD PRESSURE: 85 MMHG | OXYGEN SATURATION: 98 % | SYSTOLIC BLOOD PRESSURE: 139 MMHG

## 2023-07-15 DIAGNOSIS — Z71.1 WORRIED WELL: Primary | ICD-10-CM

## 2023-07-15 ASSESSMENT — PATIENT HEALTH QUESTIONNAIRE - PHQ9
1. LITTLE INTEREST OR PLEASURE IN DOING THINGS: 0
SUM OF ALL RESPONSES TO PHQ QUESTIONS 1-9: 0
SUM OF ALL RESPONSES TO PHQ QUESTIONS 1-9: 0
SUM OF ALL RESPONSES TO PHQ9 QUESTIONS 1 & 2: 0
2. FEELING DOWN, DEPRESSED OR HOPELESS: 0
SUM OF ALL RESPONSES TO PHQ QUESTIONS 1-9: 0
SUM OF ALL RESPONSES TO PHQ QUESTIONS 1-9: 0

## 2023-07-15 ASSESSMENT — ENCOUNTER SYMPTOMS
RESPIRATORY NEGATIVE: 1
GASTROINTESTINAL NEGATIVE: 1
EYES NEGATIVE: 1

## 2024-01-22 ENCOUNTER — HOSPITAL ENCOUNTER (EMERGENCY)
Facility: HOSPITAL | Age: 29
Discharge: HOME | End: 2024-01-23
Attending: EMERGENCY MEDICINE | Admitting: EMERGENCY MEDICINE
Payer: OTHER MISCELLANEOUS

## 2024-01-22 DIAGNOSIS — J32.9 SINUSITIS, UNSPECIFIED CHRONICITY, UNSPECIFIED LOCATION: ICD-10-CM

## 2024-01-22 DIAGNOSIS — T59.91XA TOXIC EFFECT OF GAS EXPOSURE, ACCIDENTAL OR UNINTENTIONAL, INITIAL ENCOUNTER: Primary | ICD-10-CM

## 2024-01-22 PROCEDURE — 99284 EMERGENCY DEPT VISIT MOD MDM: CPT

## 2024-01-22 NOTE — Clinical Note
[facility] EMERGENCY DEPARTMENT  [address] [address]  Phone: [phone]    Tristan Batista was seen and treated in our emergency department on 1/22/2024.  He may return to work on 01/24/2024.         Thank you for choosing [facility].    Andrew Dunne MD

## 2024-01-23 ENCOUNTER — APPOINTMENT (OUTPATIENT)
Dept: CT IMAGING | Facility: HOSPITAL | Age: 29
End: 2024-01-23
Payer: OTHER MISCELLANEOUS

## 2024-01-23 VITALS
HEART RATE: 73 BPM | RESPIRATION RATE: 16 BRPM | HEIGHT: 65 IN | SYSTOLIC BLOOD PRESSURE: 128 MMHG | BODY MASS INDEX: 33.32 KG/M2 | OXYGEN SATURATION: 97 % | TEMPERATURE: 98 F | WEIGHT: 200 LBS | DIASTOLIC BLOOD PRESSURE: 84 MMHG

## 2024-01-23 LAB
ALBUMIN SERPL-MCNC: 4.9 G/DL (ref 3.5–5.2)
ALBUMIN/GLOB SERPL: 1.8 G/DL
ALP SERPL-CCNC: 54 U/L (ref 39–117)
ALT SERPL W P-5'-P-CCNC: 21 U/L (ref 1–41)
ANION GAP SERPL CALCULATED.3IONS-SCNC: 11.1 MMOL/L (ref 5–15)
AST SERPL-CCNC: 18 U/L (ref 1–40)
ATMOSPHERIC PRESS: 743 MMHG
BASE EXCESS BLDV CALC-SCNC: 1.2 MMOL/L (ref 0–2)
BASOPHILS # BLD AUTO: 0.09 10*3/MM3 (ref 0–0.2)
BASOPHILS NFR BLD AUTO: 0.9 % (ref 0–1.5)
BDY SITE: NORMAL
BILIRUB SERPL-MCNC: 0.5 MG/DL (ref 0–1.2)
BUN SERPL-MCNC: 14 MG/DL (ref 6–20)
BUN/CREAT SERPL: 16.5 (ref 7–25)
CALCIUM SPEC-SCNC: 9.1 MG/DL (ref 8.6–10.5)
CHLORIDE SERPL-SCNC: 100 MMOL/L (ref 98–107)
CK SERPL-CCNC: 216 U/L (ref 20–200)
CO2 SERPL-SCNC: 25.9 MMOL/L (ref 22–29)
COHGB MFR BLD: 1.2 % (ref 0–5)
CREAT SERPL-MCNC: 0.85 MG/DL (ref 0.76–1.27)
DEPRECATED RDW RBC AUTO: 38 FL (ref 37–54)
EGFRCR SERPLBLD CKD-EPI 2021: 122.9 ML/MIN/1.73
EOSINOPHIL # BLD AUTO: 0.88 10*3/MM3 (ref 0–0.4)
EOSINOPHIL NFR BLD AUTO: 9.3 % (ref 0.3–6.2)
ERYTHROCYTE [DISTWIDTH] IN BLOOD BY AUTOMATED COUNT: 11.9 % (ref 12.3–15.4)
FLUAV SUBTYP SPEC NAA+PROBE: NOT DETECTED
FLUBV RNA ISLT QL NAA+PROBE: NOT DETECTED
GLOBULIN UR ELPH-MCNC: 2.8 GM/DL
GLUCOSE SERPL-MCNC: 90 MG/DL (ref 65–99)
HCO3 BLDV-SCNC: 27.1 MMOL/L (ref 22–28)
HCT VFR BLD AUTO: 46.6 % (ref 37.5–51)
HGB BLD-MCNC: 16.2 G/DL (ref 13–17.7)
IMM GRANULOCYTES # BLD AUTO: 0.02 10*3/MM3 (ref 0–0.05)
IMM GRANULOCYTES NFR BLD AUTO: 0.2 % (ref 0–0.5)
LYMPHOCYTES # BLD AUTO: 3.27 10*3/MM3 (ref 0.7–3.1)
LYMPHOCYTES NFR BLD AUTO: 34.4 % (ref 19.6–45.3)
Lab: NORMAL
MCH RBC QN AUTO: 30.3 PG (ref 26.6–33)
MCHC RBC AUTO-ENTMCNC: 34.8 G/DL (ref 31.5–35.7)
MCV RBC AUTO: 87.3 FL (ref 79–97)
METHGB BLD QL: 0.5 % (ref 0–3)
MODALITY: NORMAL
MONOCYTES # BLD AUTO: 0.75 10*3/MM3 (ref 0.1–0.9)
MONOCYTES NFR BLD AUTO: 7.9 % (ref 5–12)
NEUTROPHILS NFR BLD AUTO: 4.49 10*3/MM3 (ref 1.7–7)
NEUTROPHILS NFR BLD AUTO: 47.3 % (ref 42.7–76)
NRBC BLD AUTO-RTO: 0 /100 WBC (ref 0–0.2)
OXYHGB MFR BLDV: 54 % (ref 40–70)
PCO2 BLDV: 45.9 MM HG (ref 40–50)
PH BLDV: 7.38 PH UNITS (ref 7.32–7.42)
PLATELET # BLD AUTO: 288 10*3/MM3 (ref 140–450)
PMV BLD AUTO: 10.8 FL (ref 6–12)
PO2 BLDV: 30.1 MM HG (ref 30–50)
POTASSIUM SERPL-SCNC: 3.9 MMOL/L (ref 3.5–5.2)
PROT SERPL-MCNC: 7.7 G/DL (ref 6–8.5)
RBC # BLD AUTO: 5.34 10*6/MM3 (ref 4.14–5.8)
SAO2 % BLDCOV: 55 % (ref 45–75)
SARS-COV-2 RNA RESP QL NAA+PROBE: NOT DETECTED
SODIUM SERPL-SCNC: 137 MMOL/L (ref 136–145)
TROPONIN T SERPL HS-MCNC: <6 NG/L
VENTILATOR MODE: NORMAL
WBC NRBC COR # BLD AUTO: 9.5 10*3/MM3 (ref 3.4–10.8)

## 2024-01-23 PROCEDURE — 70450 CT HEAD/BRAIN W/O DYE: CPT

## 2024-01-23 PROCEDURE — 80053 COMPREHEN METABOLIC PANEL: CPT | Performed by: EMERGENCY MEDICINE

## 2024-01-23 PROCEDURE — 93005 ELECTROCARDIOGRAM TRACING: CPT | Performed by: EMERGENCY MEDICINE

## 2024-01-23 PROCEDURE — 87636 SARSCOV2 & INF A&B AMP PRB: CPT | Performed by: EMERGENCY MEDICINE

## 2024-01-23 PROCEDURE — 25010000002 ONDANSETRON PER 1 MG: Performed by: EMERGENCY MEDICINE

## 2024-01-23 PROCEDURE — 84484 ASSAY OF TROPONIN QUANT: CPT | Performed by: EMERGENCY MEDICINE

## 2024-01-23 PROCEDURE — 82550 ASSAY OF CK (CPK): CPT | Performed by: EMERGENCY MEDICINE

## 2024-01-23 PROCEDURE — 82805 BLOOD GASES W/O2 SATURATION: CPT

## 2024-01-23 PROCEDURE — 82820 HEMOGLOBIN-OXYGEN AFFINITY: CPT

## 2024-01-23 PROCEDURE — 85025 COMPLETE CBC W/AUTO DIFF WBC: CPT | Performed by: EMERGENCY MEDICINE

## 2024-01-23 PROCEDURE — 96374 THER/PROPH/DIAG INJ IV PUSH: CPT

## 2024-01-23 PROCEDURE — 25810000003 SODIUM CHLORIDE 0.9 % SOLUTION: Performed by: EMERGENCY MEDICINE

## 2024-01-23 RX ORDER — AMOXICILLIN AND CLAVULANATE POTASSIUM 875; 125 MG/1; MG/1
1 TABLET, FILM COATED ORAL 2 TIMES DAILY
Qty: 14 TABLET | Refills: 0 | Status: SHIPPED | OUTPATIENT
Start: 2024-01-23 | End: 2024-01-30

## 2024-01-23 RX ORDER — ONDANSETRON 4 MG/1
4 TABLET, ORALLY DISINTEGRATING ORAL EVERY 6 HOURS PRN
Qty: 10 TABLET | Refills: 0 | Status: SHIPPED | OUTPATIENT
Start: 2024-01-23

## 2024-01-23 RX ORDER — ONDANSETRON 2 MG/ML
4 INJECTION INTRAMUSCULAR; INTRAVENOUS ONCE
Status: COMPLETED | OUTPATIENT
Start: 2024-01-23 | End: 2024-01-23

## 2024-01-23 RX ADMIN — SODIUM CHLORIDE 1000 ML: 9 INJECTION, SOLUTION INTRAVENOUS at 00:27

## 2024-01-23 RX ADMIN — ONDANSETRON 4 MG: 2 INJECTION INTRAMUSCULAR; INTRAVENOUS at 00:26

## 2024-01-23 NOTE — ED PROVIDER NOTES
HPI: Tristan Batista is a 28 y.o. male who presents to the emergency department complaining of possible toxic inhalation.  Patient states that he was exposed to several hours possible carbon monoxide and other fumes from his truck.  Apparently old is leaking onto some portion of the motor and he was exposed to fumes.  Right after the exposure he had no symptoms but soon afterwards developed headache, dizziness, nausea.  It has been nearly 10 hours since he removed himself from the toxic fumes and he still has symptoms so came in for evaluation.      REVIEW OF SYSTEMS: All other systems reviewed and are negative     PAST MEDICAL HISTORY:   Past Medical History:   Diagnosis Date    Hypertension         FAMILY HISTORY:   Family History   Problem Relation Age of Onset    Dementia Maternal Grandfather         SOCIAL HISTORY:   Social History     Socioeconomic History    Marital status: Single   Tobacco Use    Smoking status: Never    Smokeless tobacco: Current     Types: Snuff   Vaping Use    Vaping Use: Never used   Substance and Sexual Activity    Alcohol use: Yes     Alcohol/week: 10.0 standard drinks of alcohol     Types: 10 Cans of beer per week     Comment: socially    Drug use: No    Sexual activity: Defer        SURGICAL HISTORY:   Past Surgical History:   Procedure Laterality Date    KNEE SURGERY Left         ALLERGIES: Patient has no known allergies.       PHYSICAL EXAM:   VITAL SIGNS:   Vitals:    01/23/24 0059   BP: 123/79   Pulse: 75   Resp:    Temp:    SpO2: 91%      CONSTITUTIONAL: Awake, well appearing, nontoxic   HENT: Atraumatic, normocephalic, oral mucosa moist, airway patent. Nares patent without drainage. External ears normal.   EYES: Conjunctivae clear, EOMI, PERRL   NECK: Trachea midline, nontender, supple   CARDIOVASCULAR: Normal heart rate, Normal rhythm.  PULMONARY/CHEST: Normal work of breathing. Clear to auscultation, no rhonchi, wheezes, or rales.  ABDOMINAL: Nondistended, soft, nontender, no  rebound or guarding.  NEUROLOGIC: Nonfocal, moves all four extremities, no gross sensory or motor deficits.   EXTREMITIES: No clubbing, cyanosis, or edema   SKIN: Warm, Dry, No erythema, No rash       ED COURSE / MEDICAL DECISION MAKING:     Tristan Batista is a 28 y.o. male who presents to the emergency department for evaluation of possible exposure to toxic fumes.  He is well-appearing, he is in no distress.  His vital signs here are normal.  His exam is overall unremarkable.  Will obtain labs to include VBG, head CT and give symptomatic treatment.  Disposition pending.    Differential diagnosis includes carbon oxide poisoning, toxic inhalation, viral illness, dehydration, electrolyte abnormality among other etiologies.    EKG interpreted by me: Sinus rhythm, normal rate, no acute ST/T changes, this is a normal EKG    Lab work is nonactionable including a normal carboxyhemoglobin.  CT of the head reveals some sinus disease.  He is resting comfortably.  I do feel he is safe for discharge home at this time.  Discussed outpatient follow-up and return precautions.  He is agreeable with plan of care.    Final diagnoses:   Toxic effect of gas exposure, accidental or unintentional, initial encounter   Sinusitis, unspecified chronicity, unspecified location        Andrew Dunne MD  01/23/24 3050

## 2024-07-02 ENCOUNTER — OFFICE VISIT (OUTPATIENT)
Dept: NEUROLOGY | Facility: CLINIC | Age: 29
End: 2024-07-02
Payer: COMMERCIAL

## 2024-07-02 VITALS
TEMPERATURE: 98.4 F | OXYGEN SATURATION: 100 % | RESPIRATION RATE: 14 BRPM | HEART RATE: 75 BPM | DIASTOLIC BLOOD PRESSURE: 78 MMHG | HEIGHT: 65 IN | SYSTOLIC BLOOD PRESSURE: 132 MMHG | WEIGHT: 209.2 LBS | BODY MASS INDEX: 34.85 KG/M2

## 2024-07-02 DIAGNOSIS — R68.89 SPELLS OF DECREASED ATTENTIVENESS: Primary | ICD-10-CM

## 2024-07-02 DIAGNOSIS — R20.2 PARESTHESIA: ICD-10-CM

## 2024-07-02 PROCEDURE — 99204 OFFICE O/P NEW MOD 45 MIN: CPT | Performed by: NURSE PRACTITIONER

## 2024-07-02 RX ORDER — CETIRIZINE HYDROCHLORIDE 10 MG/1
1 TABLET ORAL DAILY
COMMUNITY

## 2024-07-02 NOTE — PROGRESS NOTES
"     New Patient Office Visit      Patient Name: Tristan Batista  : 1995   MRN: [id number]     Referring Physician: Hayley Ram A*    Chief Complaint:    Chief Complaint   Patient presents with    Establish Care     Last , pt started having numbness and tingling in his hands and face. He went to ER and they found low potassium.        History of Present Illness: Tristan Batista is a 29 y.o. male who is here today to establish care with Neurology.  He was referred to us from PCP ([facility]) for paresthesia to face and hands and spells of inattentiveness.  He is here today with his fiancéeAyanna.  He is a former patient of this practice for [facility] and was last seen  ([facility]).     One year onset of intermittent facial numbness and bilateral hand drawling up and his hands lock and he mitul have inability to speak for a few minutes. He reports his arms draw in and he becomes \"dissociated\" with his surroundings. He can not talk or swallow water during these events. He self transported to ER and was told he has hypokalemia and hyponatremia. He admits ETOH use prior to onset both times and both times occurred in the summer months when he was dehydrated. Last episode  and went to [facility] ER and was diagnosed with faical paresthesia. The only medications he takes are zyrtec and multivitamins.  Following spell periods he feels very tired and out of it like he is just run a marathon.  Denies seizure activity    Admits to being a very anxious person with OCD.  Getting  this weekend.  Very nervous in healthcare settings.  Has tried and failed numerous SSRIs.  Not open to counseling or psychiatry.  Some caffeine use.  High nicotine use.    SOCIAL: Getting  this week to Ayanna. Works for [employer] and is going to EMT school. HS graduate. No smoking, but is using Zinnies 3 mg or 6 mg and using 2/3 can per day. No vaping. No cannabis or recreational drugs. No ETOH x 1 month. "     [facility]: Father- Migraines, Maternal Great Grandmother- Dementia     Recent Imaging:    CT Head WO 01/24- noncontributory  CTA Neck 06/23 - noncontributory   CTA Head 06/23 - noncontributory     Pertinent Medical History: HTN, SABI, Concussion 2021 (?),  Anxiety    Subjective      Review of Systems:   Review of Systems   HENT: Negative.     Eyes: Negative.    Respiratory: Negative.     Cardiovascular: Negative.    Gastrointestinal: Negative.    Endocrine: Negative.    Genitourinary: Negative.    Musculoskeletal: Negative.    Skin: Negative.    Allergic/Immunologic: Negative.    Neurological:  Positive for weakness and numbness.   Hematological: Negative.    Psychiatric/Behavioral: Negative.     All other systems reviewed and are negative.      Past Medical History:   Past Medical History:   Diagnosis Date    Hypertension        Past Surgical History:   Past Surgical History:   Procedure Laterality Date    KNEE SURGERY Left        Family History:   Family History   Problem Relation Age of Onset    Dementia Maternal Grandfather     Alzheimer's disease Maternal Grandfather        Social History:   Social History     Socioeconomic History    Marital status: Single   Tobacco Use    Smoking status: Never    Smokeless tobacco: Current     Types: Snuff   Vaping Use    Vaping status: Never Used   Substance and Sexual Activity    Alcohol use: Never     Alcohol/week: 10.0 standard drinks of alcohol     Types: 10 Cans of beer per week     Comment: socially    Drug use: No    Sexual activity: Defer       Medications:     Current Outpatient Medications:     cetirizine (ZyrTEC Allergy) 10 MG tablet, Take 1 tablet by mouth Daily., Disp: , Rfl:     Allergies:   No Known Allergies    Objective     Physical Exam:  Vital Signs:   Vitals:    07/02/24 1330   BP: 132/78   BP Location: Right arm   Patient Position: Sitting   Cuff Size: Adult   Pulse: 75   Resp: 14   Temp: 98.4 °F (36.9 °C)   TempSrc: Infrared   SpO2: 100%  "  Weight: 94.9 kg (209 lb 3.2 oz)   Height: 165 cm (64.96\")   PainSc: 0-No pain     Body mass index is 34.85 kg/m².     Physical Exam  Vitals and nursing note reviewed.   Constitutional:       General: He is not in acute distress.     Appearance: Normal appearance. He is normal weight.   HENT:      Head: Normocephalic.      Nose: Nose normal.      Mouth/Throat:      Mouth: Mucous membranes are moist.      Pharynx: Oropharynx is clear.   Eyes:      Extraocular Movements: Extraocular movements intact.      Conjunctiva/sclera: Conjunctivae normal.      Pupils: Pupils are equal, round, and reactive to light.   Musculoskeletal:      Cervical back: Normal range of motion and neck supple. No rigidity.   Skin:     General: Skin is warm and dry.      Capillary Refill: Capillary refill takes less than 2 seconds.   Neurological:      General: No focal deficit present.      Mental Status: He is alert and oriented to person, place, and time.      Cranial Nerves: Cranial nerves 2-12 are intact.      Coordination: Romberg Test abnormal. Finger-Nose-Finger Test normal.      Gait: Gait is intact.   Psychiatric:         Mood and Affect: Mood normal.         Speech: Speech normal.         Behavior: Behavior normal.         Thought Content: Thought content normal.         Judgment: Judgment normal.         Neurologic Exam     Mental Status   Oriented to person, place, and time.   Speech: speech is normal   Level of consciousness: alert  Knowledge: good.     Cranial Nerves   Cranial nerves II through XII intact.     CN III, IV, VI   Pupils are equal, round, and reactive to light.    Motor Exam   Muscle bulk: normal  Overall muscle tone: normal  Right arm tone: normal  Left arm tone: normal  Right arm pronator drift: absent  Left arm pronator drift: absent  Right leg tone: normal  Left leg tone: normal    Strength   Right biceps: 5/5  Left biceps: 5/5  Right triceps: 5/5  Left triceps: 5/5  Right quadriceps: 5/5  Left quadriceps: " 5/5  Right hamstrin/5  Left hamstrin/5    Sensory Exam   Light touch normal.     Gait, Coordination, and Reflexes     Gait  Gait: normal    Coordination   Romberg: positive  Finger to nose coordination: normal    Tremor   Resting tremor: absent    Reflexes   Right Contreras: absent  Left Contreras: absent      PHQ-9 Total Score:       Assessment / Plan      Assessment/Plan:   Diagnoses and all orders for this visit:    1. Spells of decreased attentiveness (Primary)  -     MRI Brain With & Without Contrast; Future  -     EEG; Future    2. Paresthesia  -     MRI Brain With & Without Contrast; Future  -     EEG; Future           Follow Up:   Return in about 3 months (around 10/2/2024), or if symptoms worsen or fail to improve.    Anticipatory Guidance and Safety Reviewed  Patient Education Provided  MRI Brain W/WO r/o demyelinating disease  EEG r/o seizure disorder  Will get records from [facility] and review  Long discussion on anxiety management that is nonpharmacologic: Nicotine reduction, caffeine reduction, exercise greater than 150 minutes/week, sleep hygiene, stress reduction, self-care, CBT.   Declined offer of referral to psychiatry or behavioral health for counseling  Seizure precautions reviewed    RTC PRN or within 12 weeks or sooner with issues     IVIS Henderson  [facility]

## 2024-08-04 ENCOUNTER — APPOINTMENT (OUTPATIENT)
Dept: CT IMAGING | Facility: HOSPITAL | Age: 29
End: 2024-08-04

## 2024-08-04 ENCOUNTER — HOSPITAL ENCOUNTER (EMERGENCY)
Facility: HOSPITAL | Age: 29
Discharge: HOME | End: 2024-08-04
Attending: EMERGENCY MEDICINE | Admitting: EMERGENCY MEDICINE

## 2024-08-04 VITALS
BODY MASS INDEX: 34.99 KG/M2 | RESPIRATION RATE: 22 BRPM | SYSTOLIC BLOOD PRESSURE: 129 MMHG | DIASTOLIC BLOOD PRESSURE: 94 MMHG | HEIGHT: 65 IN | HEART RATE: 93 BPM | WEIGHT: 210 LBS | OXYGEN SATURATION: 100 % | TEMPERATURE: 97.5 F

## 2024-08-04 DIAGNOSIS — E83.39 HYPOPHOSPHATEMIA: ICD-10-CM

## 2024-08-04 DIAGNOSIS — R20.2 FACIAL PARESTHESIA: Primary | ICD-10-CM

## 2024-08-04 LAB
ALBUMIN SERPL-MCNC: 4.7 G/DL (ref 3.5–5.2)
ALBUMIN/GLOB SERPL: 1.6 G/DL
ALP SERPL-CCNC: 65 U/L (ref 39–117)
ALT SERPL W P-5'-P-CCNC: 22 U/L (ref 1–41)
ANION GAP SERPL CALCULATED.3IONS-SCNC: 12.4 MMOL/L (ref 5–15)
AST SERPL-CCNC: 20 U/L (ref 1–40)
BASOPHILS # BLD AUTO: 0.1 10*3/MM3 (ref 0–0.2)
BASOPHILS NFR BLD AUTO: 1.4 % (ref 0–1.5)
BILIRUB SERPL-MCNC: 0.7 MG/DL (ref 0–1.2)
BUN SERPL-MCNC: 12 MG/DL (ref 6–20)
BUN/CREAT SERPL: 11.9 (ref 7–25)
CALCIUM SPEC-SCNC: 9.5 MG/DL (ref 8.6–10.5)
CHLORIDE SERPL-SCNC: 100 MMOL/L (ref 98–107)
CO2 SERPL-SCNC: 23.6 MMOL/L (ref 22–29)
CREAT SERPL-MCNC: 1.01 MG/DL (ref 0.76–1.27)
DEPRECATED RDW RBC AUTO: 38.4 FL (ref 37–54)
EGFRCR SERPLBLD CKD-EPI 2021: 104.5 ML/MIN/1.73
EOSINOPHIL # BLD AUTO: 0.76 10*3/MM3 (ref 0–0.4)
EOSINOPHIL NFR BLD AUTO: 10.7 % (ref 0.3–6.2)
ERYTHROCYTE [DISTWIDTH] IN BLOOD BY AUTOMATED COUNT: 12 % (ref 12.3–15.4)
GLOBULIN UR ELPH-MCNC: 2.9 GM/DL
GLUCOSE SERPL-MCNC: 106 MG/DL (ref 65–99)
HCT VFR BLD AUTO: 45.9 % (ref 37.5–51)
HGB BLD-MCNC: 16 G/DL (ref 13–17.7)
HOLD SPECIMEN: NORMAL
HOLD SPECIMEN: NORMAL
IMM GRANULOCYTES # BLD AUTO: 0.02 10*3/MM3 (ref 0–0.05)
IMM GRANULOCYTES NFR BLD AUTO: 0.3 % (ref 0–0.5)
LYMPHOCYTES # BLD AUTO: 2.74 10*3/MM3 (ref 0.7–3.1)
LYMPHOCYTES NFR BLD AUTO: 38.5 % (ref 19.6–45.3)
MAGNESIUM SERPL-MCNC: 2 MG/DL (ref 1.6–2.6)
MCH RBC QN AUTO: 30.3 PG (ref 26.6–33)
MCHC RBC AUTO-ENTMCNC: 34.9 G/DL (ref 31.5–35.7)
MCV RBC AUTO: 86.9 FL (ref 79–97)
MONOCYTES # BLD AUTO: 0.55 10*3/MM3 (ref 0.1–0.9)
MONOCYTES NFR BLD AUTO: 7.7 % (ref 5–12)
NEUTROPHILS NFR BLD AUTO: 2.94 10*3/MM3 (ref 1.7–7)
NEUTROPHILS NFR BLD AUTO: 41.4 % (ref 42.7–76)
NRBC BLD AUTO-RTO: 0 /100 WBC (ref 0–0.2)
PHOSPHATE SERPL-MCNC: 2.1 MG/DL (ref 2.5–4.5)
PLATELET # BLD AUTO: 284 10*3/MM3 (ref 140–450)
PMV BLD AUTO: 10.6 FL (ref 6–12)
POTASSIUM SERPL-SCNC: 3.6 MMOL/L (ref 3.5–5.2)
PROT SERPL-MCNC: 7.6 G/DL (ref 6–8.5)
RBC # BLD AUTO: 5.28 10*6/MM3 (ref 4.14–5.8)
SODIUM SERPL-SCNC: 136 MMOL/L (ref 136–145)
WBC NRBC COR # BLD AUTO: 7.11 10*3/MM3 (ref 3.4–10.8)
WHOLE BLOOD HOLD COAG: NORMAL
WHOLE BLOOD HOLD SPECIMEN: NORMAL

## 2024-08-04 PROCEDURE — 25010000002 LORAZEPAM PER 2 MG: Performed by: EMERGENCY MEDICINE

## 2024-08-04 PROCEDURE — 80053 COMPREHEN METABOLIC PANEL: CPT | Performed by: EMERGENCY MEDICINE

## 2024-08-04 PROCEDURE — 83735 ASSAY OF MAGNESIUM: CPT

## 2024-08-04 PROCEDURE — 84100 ASSAY OF PHOSPHORUS: CPT

## 2024-08-04 PROCEDURE — 70450 CT HEAD/BRAIN W/O DYE: CPT

## 2024-08-04 PROCEDURE — 85025 COMPLETE CBC W/AUTO DIFF WBC: CPT | Performed by: EMERGENCY MEDICINE

## 2024-08-04 PROCEDURE — 99284 EMERGENCY DEPT VISIT MOD MDM: CPT

## 2024-08-04 PROCEDURE — 96374 THER/PROPH/DIAG INJ IV PUSH: CPT

## 2024-08-04 PROCEDURE — 93005 ELECTROCARDIOGRAM TRACING: CPT | Performed by: EMERGENCY MEDICINE

## 2024-08-04 RX ORDER — LORAZEPAM 2 MG/ML
1 INJECTION INTRAMUSCULAR ONCE
Status: COMPLETED | OUTPATIENT
Start: 2024-08-04 | End: 2024-08-04

## 2024-08-04 RX ORDER — SODIUM CHLORIDE 0.9 % (FLUSH) 0.9 %
10 SYRINGE (ML) INJECTION AS NEEDED
Status: DISCONTINUED | OUTPATIENT
Start: 2024-08-04 | End: 2024-08-04 | Stop reason: HOSPADM

## 2024-08-04 RX ORDER — SODIUM,POTASSIUM PHOSPHATES 280-250MG
1 POWDER IN PACKET (EA) ORAL
Qty: 12 PACKET | Refills: 0 | Status: SHIPPED | OUTPATIENT
Start: 2024-08-04 | End: 2024-08-07

## 2024-08-04 RX ADMIN — LORAZEPAM 1 MG: 2 INJECTION, SOLUTION INTRAMUSCULAR; INTRAVENOUS at 16:09

## 2024-08-04 RX ADMIN — DIBASIC SODIUM PHOSPHATE, MONOBASIC POTASSIUM PHOSPHATE AND MONOBASIC SODIUM PHOSPHATE 1 TABLET: 852; 155; 130 TABLET ORAL at 17:07

## 2024-08-04 NOTE — ED PROVIDER NOTES
"Subjective  History of Present Illness:    This is a 29-year-old male, history of hypertension, present emergency room today for evaluation of numbness.  Patient reports that this started this morning while he was laying in bed, noted to the left side of the face.  He reports it is more of a tingling sensation, has been diagnosed with paresthesias in the past, has had this issue multiple times in the past, however in the past it has progressed to weakness and difficulty swallowing.  He denies any of those current symptoms today, has had 2-3 episodes similar in the last year, following with neurology, currently was scheduled to undergo an MRI and EEG in the near future, however reports that he does not have any insurance at this time and his neurologist told him if he developed any type of the symptoms to come back to the emergency room for consideration of an MRI, therefore he presents today for further evaluation.  He denies any unilateral weakness.  Reports that his paresthesias essentially localized to the left facial region.  No trauma no injuries.  He reports a \"disconnect\" from his left upper extremity since the symptoms started 1 and half years ago.  No significant headaches.  No nausea no vomiting no diarrhea no chest pain or shortness of air.  He reports that he has tried multiple antianxiety medications to the effect of 6 or 7 without finding any that have worked for him, he reports a history of OCD as well as anxiety and feels that his anxiety may be contributing to his presentation.  He reports history of prior  service.      Nurses Notes reviewed and agree, including vitals, allergies, social history and prior medical history.     REVIEW OF SYSTEMS: All systems reviewed and not pertinent unless noted.  Review of Systems   Respiratory:  Negative for shortness of breath.    Cardiovascular:  Negative for chest pain.   Gastrointestinal:  Negative for abdominal distention, diarrhea, nausea and " "vomiting.   Neurological:  Positive for numbness. Negative for dizziness, seizures, syncope, facial asymmetry, speech difficulty and headaches.   All other systems reviewed and are negative.      Past Medical History:   Diagnosis Date    Hypertension        Allergies:    Patient has no known allergies.      Past Surgical History:   Procedure Laterality Date    KNEE SURGERY Left          Social History     Socioeconomic History    Marital status: Single   Tobacco Use    Smoking status: Never    Smokeless tobacco: Current     Types: Snuff   Vaping Use    Vaping status: Never Used   Substance and Sexual Activity    Alcohol use: Never     Alcohol/week: 10.0 standard drinks of alcohol     Types: 10 Cans of beer per week     Comment: socially    Drug use: No    Sexual activity: Defer         Family History   Problem Relation Age of Onset    Dementia Maternal Grandfather     Alzheimer's disease Maternal Grandfather        Objective  Physical Exam:  /86   Pulse 82   Temp 97.5 °F (36.4 °C)   Resp 22   Ht 165.1 cm (65\")   Wt 95.3 kg (210 lb)   SpO2 93%   BMI 34.95 kg/m²      Physical Exam  Vitals and nursing note reviewed.   Constitutional:       General: He is not in acute distress.     Appearance: Normal appearance. He is obese. He is not ill-appearing, toxic-appearing or diaphoretic.   HENT:      Head: Normocephalic and atraumatic.      Nose: Nose normal.      Mouth/Throat:      Mouth: Mucous membranes are moist.      Pharynx: Oropharynx is clear.   Eyes:      Extraocular Movements: Extraocular movements intact.      Conjunctiva/sclera: Conjunctivae normal.      Pupils: Pupils are equal, round, and reactive to light.   Cardiovascular:      Rate and Rhythm: Normal rate and regular rhythm.      Pulses: Normal pulses.      Heart sounds: Normal heart sounds.   Pulmonary:      Effort: Pulmonary effort is normal. No respiratory distress.      Breath sounds: Normal breath sounds. No stridor. No wheezing, rhonchi or " rales.   Abdominal:      General: There is no distension.      Palpations: Abdomen is soft.      Tenderness: There is no abdominal tenderness. There is no guarding.   Musculoskeletal:         General: Normal range of motion.      Cervical back: Normal range of motion.   Skin:     General: Skin is warm and dry.      Capillary Refill: Capillary refill takes less than 2 seconds.   Neurological:      General: No focal deficit present.      Mental Status: He is alert and oriented to person, place, and time.      Cranial Nerves: No cranial nerve deficit.      Sensory: No sensory deficit.      Motor: No weakness.      Coordination: Coordination normal.   Psychiatric:         Mood and Affect: Mood normal.         Behavior: Behavior normal.         Thought Content: Thought content normal.         Judgment: Judgment normal.             Procedures    ED Course:    ED Course as of 08/04/24 1746   Sun Aug 04, 2024   1624 EKG: I reviewed and independently interpreted the EKG as:  Sinus rhythm rate of 92 bpm, normal axis, normal intervals, no ST elevation, no T wave inversions [CS]   1745 Phosphorus(!) [JR]      ED Course User Index  [CS] Kye Tripp MD  [JR] Stefano Major PA-C       Lab Results (last 24 hours)       Procedure Component Value Units Date/Time    CBC & Differential [[id number]]  (Abnormal) Collected: 08/04/24 1551    Specimen: Blood Updated: 08/04/24 1601    Narrative:      The following orders were created for panel order CBC & Differential.  Procedure                               Abnormality         Status                     ---------                               -----------         ------                     CBC Auto Differential[[id number]]        Abnormal            Final result                 Please view results for these tests on the individual orders.    Comprehensive Metabolic Panel [[id number]]  (Abnormal) Collected: 08/04/24 1551    Specimen: Blood Updated: 08/04/24 1618     Glucose 106  mg/dL      BUN 12 mg/dL      Creatinine 1.01 mg/dL      Sodium 136 mmol/L      Potassium 3.6 mmol/L      Chloride 100 mmol/L      CO2 23.6 mmol/L      Calcium 9.5 mg/dL      Total Protein 7.6 g/dL      Albumin 4.7 g/dL      ALT (SGPT) 22 U/L      AST (SGOT) 20 U/L      Alkaline Phosphatase 65 U/L      Total Bilirubin 0.7 mg/dL      Globulin 2.9 gm/dL      A/G Ratio 1.6 g/dL      BUN/Creatinine Ratio 11.9     Anion Gap 12.4 mmol/L      eGFR 104.5 mL/min/1.73     Narrative:      GFR Normal >60  Chronic Kidney Disease <60  Kidney Failure <15      CBC Auto Differential [[id number]]  (Abnormal) Collected: 08/04/24 1551    Specimen: Blood Updated: 08/04/24 1601     WBC 7.11 10*3/mm3      RBC 5.28 10*6/mm3      Hemoglobin 16.0 g/dL      Hematocrit 45.9 %      MCV 86.9 fL      MCH 30.3 pg      MCHC 34.9 g/dL      RDW 12.0 %      RDW-SD 38.4 fl      MPV 10.6 fL      Platelets 284 10*3/mm3      Neutrophil % 41.4 %      Lymphocyte % 38.5 %      Monocyte % 7.7 %      Eosinophil % 10.7 %      Basophil % 1.4 %      Immature Grans % 0.3 %      Neutrophils, Absolute 2.94 10*3/mm3      Lymphocytes, Absolute 2.74 10*3/mm3      Monocytes, Absolute 0.55 10*3/mm3      Eosinophils, Absolute 0.76 10*3/mm3      Basophils, Absolute 0.10 10*3/mm3      Immature Grans, Absolute 0.02 10*3/mm3      nRBC 0.0 /100 WBC     Magnesium [[id number]]  (Normal) Collected: 08/04/24 1551    Specimen: Blood Updated: 08/04/24 1618     Magnesium 2.0 mg/dL     Phosphorus [[id number]]  (Abnormal) Collected: 08/04/24 1551    Specimen: Blood Updated: 08/04/24 1618     Phosphorus 2.1 mg/dL              CT Head Without Contrast    Result Date: 8/4/2024  PROCEDURE: CT HEAD WO CONTRAST-  HISTORY: left sided facial tingling, rule out acute bleed  COMPARISON: January 23, 2024..  TECHNIQUE: Multiple axial CT images were performed from the foramen magnum to the vertex. Individualized dose reduction techniques using automated exposure control or adjustment of mA and/or kV  according to the patient size were employed.  FINDINGS: The brain parenchyma is unremarkable.  The ventricles are proper size. There is no evidence of hemorrhage. No masses are identified. No abnormal extra-axial fluid is seen. The paranasal sinuses and mastoid air cells demonstrate mild mucoperiosteal thickening bilateral maxillary and ethmoid and frontal sinuses. There is partial opacification of the left side of the sphenoid sinus. Mastoids are clear.      Impression: No acute intracranial process.  Sinus disease as described similar to the prior study.   This report was signed and finalized on 8/4/2024 4:56 PM by Shae Elkins MD.          [facility]      Initial impression of presenting illness: This is a 29-year male presented for evaluation of tingling to the left side of the face.    DDX: includes but is not limited to: Considerations include trigeminal neuralgia, paresthesias, hypokalemia, hypomagnesemia, electrolyte imbalance, CVA TIA, anxiety, panic disorder, OCD, others    Patient arrives hemodynamically stable afebrile mildly tachycardic at a rate of 106 nontachypneic nonhypoxic nontoxic-appearing with vitals interpreted by myself.     Pertinent features from physical exam: Cranial nerves II to XII grossly intact on my assessment.  He has no focal deficits, smile is symmetric, no dysarthria, no aphasia, pupils PERRLA, no vertical nystagmus, he is alert and oriented x 4.  Follows all commands appropriately.  He has equal upper and lower extremity  strength and equal push and pull, his coordination is intact in the upper lower extremities as well as sensory.  He is neurovascular intact bilaterally.  Abdomen soft nontender nonreactive, no visual field deficits, cardiac auscultation regular rate and rhythm lungs were clear.  He reports mild sensory decrease to the left perioral region on palpitation.  Patient noted to ambulate with a normal gait.    Initial diagnostic plan: CBC CMP magnesium phosphorus EKG, CT  head without contrast    Results from initial plan were reviewed and interpreted by me revealing CBC is unremarkable, CMP with a glucose of 106, magnesium normal.  EKG reviewed sinus rhythm rate of 92 no ST elevation or T wave versions.  Magnesium was normal.  Hypophosphatemia with a phosphorus of 2.1.  CT head per radiology interpretation no acute intracranial process.    Diagnostic information from other sources: Record reviewed    Interventions / Re-evaluation: 1 dose of 1 mg IV Ativan for significant anxiety present at bedside, given K-Phos Neutral tablet at 250 mg.  Stable for discharge.    Results/clinical rationale were discussed with patient at bedside.  Low suspicion for CVA or acute stroke.  Patient has had multiple episodes similar in the past, he has isolated facial tingling today with significant anxiety on his arrival to the emergency department.  Suspect this is likely underlying anxiety panic disorder, however patient can follow-up with neurology outpatient as he has no focal deficits with localization of mild perioral paresthesia left-sided..     Consultations/Discussion of results with other physicians: Discussed with ED attending physician.    Disposition plan: Discharge, will prescribe phosphorus supplementation 4 times daily for the next 3 days with meals and have him follow-up for recheck of his phosphorus levels.  Hypophosphatemia can cause perioral paresthesias and suspect this may have an underlying component in his presentation today for the localized left-sided perioral paresthesia.  However do believe he would benefit him to follow-up with neurology in the future for further assessment.  He was given return precautions.  -----    Final diagnoses:   Facial paresthesia   Hypophosphatemia          Stefano Major PA-C  08/04/24 0616

## 2024-08-04 NOTE — DISCHARGE INSTRUCTIONS
Follow-up with your neurologist, I have sent phosphorus supplementation that you should take as directed, avoid excessive alcohol use as this can contribute to low phosphorus levels.  Recommend following up with your primary care physician in the next week to have repeat phosphorus levels drawn.

## 2025-01-15 ENCOUNTER — TELEPHONE (OUTPATIENT)
Dept: NEUROLOGY | Facility: CLINIC | Age: 30
End: 2025-01-15
Payer: OTHER MISCELLANEOUS

## 2025-01-15 ENCOUNTER — TELEPHONE (OUTPATIENT)
Dept: NEUROLOGY | Facility: CLINIC | Age: 30
End: 2025-01-15

## 2025-01-15 NOTE — TELEPHONE ENCOUNTER
RE-OPENED ORDERS. CALLED AND SPOKE TO MARI TO LET HIM KNOW HE CAN CALL BACK TO SCHEDULE THE EEG AND MRI BRAIN.

## 2025-01-15 NOTE — TELEPHONE ENCOUNTER
Caller: Tristan Batista    Relationship:  Self    Best call back number: [phone]    PATIENT CALLED REQUESTING TO CANCEL SAME DAY APPT.    Did the patient call AFTER the start time of their scheduled appointment?  []YES  [x]NO    Was the patient's appointment rescheduled? [x]YES  []NO    Any additional information: PATIENT HAS NOT COMPLETED THE TESTING NEEDED. HE IS GOING TO SPEAK TO CENTRAL SCHEDULING NOW AND R.S HIS APPT

## 2025-01-15 NOTE — TELEPHONE ENCOUNTER
Caller: Batista Tristan    Relationship: Self    Best call back number: [phone] -PLEASE LEAVE DETAILED VM IF UNABLE TO REACH PT DIRECTLY.    What was the call regarding: PT STATES BH CENTRALIZED SCHEDULING ADVISED HIM THAT THE ORDERS FOR MRI BRAIN SCAN & EEG HAVE . THEY REQUIRE NEW ORDERS BE SUBMITTED IN ORDER TO PROCEED WITH SCHEDULING PT.    Do you require a callback: YES, PLEASE NOTIFY PT ONCE NEW ORDERS HAVE BEEN SUBMITTED.    Is it okay if the provider responds through [employer]?: NO    PLEASE REVIEW AND ADVISE.

## 2025-02-20 ENCOUNTER — HOSPITAL ENCOUNTER (OUTPATIENT)
Dept: MRI IMAGING | Facility: HOSPITAL | Age: 30
Discharge: HOME | End: 2025-02-20
Payer: COMMERCIAL

## 2025-02-20 ENCOUNTER — HOSPITAL ENCOUNTER (OUTPATIENT)
Dept: SLEEP MEDICINE | Facility: HOSPITAL | Age: 30
Discharge: HOME | End: 2025-02-20
Admitting: NURSE PRACTITIONER
Payer: COMMERCIAL

## 2025-02-20 DIAGNOSIS — R20.2 PARESTHESIA: ICD-10-CM

## 2025-02-20 DIAGNOSIS — R68.89 SPELLS OF DECREASED ATTENTIVENESS: ICD-10-CM

## 2025-02-20 PROCEDURE — A9577 INJ MULTIHANCE: HCPCS | Performed by: NURSE PRACTITIONER

## 2025-02-20 PROCEDURE — 95816 EEG AWAKE AND DROWSY: CPT

## 2025-02-20 PROCEDURE — 25510000002 GADOBENATE DIMEGLUMINE 529 MG/ML SOLUTION: Performed by: NURSE PRACTITIONER

## 2025-02-20 PROCEDURE — 70553 MRI BRAIN STEM W/O & W/DYE: CPT

## 2025-02-20 RX ADMIN — GADOBENATE DIMEGLUMINE 15 ML: 529 INJECTION, SOLUTION INTRAVENOUS at 09:39

## 2025-03-06 ENCOUNTER — OFFICE VISIT (OUTPATIENT)
Dept: NEUROLOGY | Facility: CLINIC | Age: 30
End: 2025-03-06
Payer: COMMERCIAL

## 2025-03-06 VITALS
HEIGHT: 65 IN | BODY MASS INDEX: 33.82 KG/M2 | TEMPERATURE: 97.1 F | DIASTOLIC BLOOD PRESSURE: 90 MMHG | WEIGHT: 203 LBS | OXYGEN SATURATION: 98 % | SYSTOLIC BLOOD PRESSURE: 140 MMHG | HEART RATE: 96 BPM

## 2025-03-06 DIAGNOSIS — R68.89 SPELLS OF DECREASED ATTENTIVENESS: Primary | ICD-10-CM

## 2025-03-06 DIAGNOSIS — F10.11 HISTORY OF ETOH ABUSE: ICD-10-CM

## 2025-03-06 DIAGNOSIS — R20.2 PARESTHESIA: ICD-10-CM

## 2025-03-06 PROCEDURE — 99214 OFFICE O/P EST MOD 30 MIN: CPT | Performed by: NURSE PRACTITIONER

## 2025-03-06 RX ORDER — FLUTICASONE PROPIONATE 50 MCG
2 SPRAY, SUSPENSION (ML) NASAL DAILY
COMMUNITY

## 2025-03-06 NOTE — PROGRESS NOTES
Follow Up Office Visit      Patient Name: Tristan Batista  : 1995   MRN: [id number]     Chief Complaint:    Chief Complaint   Patient presents with    Follow-up     Pt states he hasn't had an event since Aug 6th 2024. States he doesn't pat attention to the L hand & leg feeling anymore.     Spells of decreased attentiveness    Numbness       History of Present Illness: Tristan Batista is a 29 y.o. male who is here today to follow up with neurology for spells of decreased attentiveness. He was last seen in clinic  ([facility]).     He was sent for MRI and EEG. He is hesitant to admit that he has been struggling with ETOH. Previously would drink 1/5th to 1 liter liquor per day. Previously went to detox inpatient program x 2 and also attended AA. He feels he is doing well with his cravings. He does not want to see Behavioral Health for ETOH. Detox symptoms include auditory and visual hallucinations, tremor and N with sweating. Never had a seizure with detox. He admits he did have another episode of decreased attentiveness and dissociative state. He admits he had completed a binge 24, when episode occurred. No seizure activity. Last drink 24.     Recent Imaging:    MRI Brain W/WO  - noncontributory   EEG - normal    CT Head WO - noncontributory  CTA Neck  - noncontributory   CTA Head  - noncontributory      Pertinent Medical History: HTN, SABI, Concussion  (?),  Anxiety    Subjective      Review of Systems:   Review of Systems   Constitutional: Negative.    HENT: Negative.     Eyes: Negative.    Respiratory: Negative.     Cardiovascular: Negative.    Gastrointestinal: Negative.    Endocrine: Negative.    Genitourinary: Negative.    Musculoskeletal: Negative.    Skin: Negative.    Allergic/Immunologic: Negative.    Neurological:  Negative for seizures.   Hematological: Negative.    Psychiatric/Behavioral: Negative.     All other systems reviewed and are negative.      I have  "reviewed and the following portions of the patient's history were updated as appropriate: past family history, past medical history, past social history, past surgical history and problem list.    Medications:     Current Outpatient Medications:     fluticasone (FLONASE) 50 MCG/ACT nasal spray, Administer 2 sprays into the nostril(s) as directed by provider Daily., Disp: , Rfl:     Allergies:   Allergies   Allergen Reactions    Orange Juice Swelling     Pt states his throat swells when he drinks orange juice        Objective     Physical Exam:  Vital Signs:   Vitals:    03/06/25 0841   BP: 140/90   BP Location: Left arm   Patient Position: Sitting   Cuff Size: Adult   Pulse: 96   Temp: 97.1 °F (36.2 °C)   TempSrc: Infrared   SpO2: 98%   Weight: 92.1 kg (203 lb)   Height: 165.1 cm (65\")   PainSc: 0-No pain     Body mass index is 33.78 kg/m².    Physical Exam  Vitals and nursing note reviewed.   Constitutional:       General: He is not in acute distress.     Appearance: Normal appearance.   HENT:      Head: Normocephalic.      Nose: Nose normal.      Mouth/Throat:      Mouth: Mucous membranes are moist.      Pharynx: Oropharynx is clear.   Eyes:      Extraocular Movements: Extraocular movements intact.      Conjunctiva/sclera: Conjunctivae normal.   Musculoskeletal:      Cervical back: Normal range of motion and neck supple.   Skin:     General: Skin is warm and dry.      Capillary Refill: Capillary refill takes less than 2 seconds.   Neurological:      General: No focal deficit present.      Mental Status: He is alert and oriented to person, place, and time.   Psychiatric:         Mood and Affect: Mood normal.         Behavior: Behavior normal.         Neurological Exam  Mental Status  Alert. Oriented to person, place, and time.    Cranial Nerves  CN III, IV, VI: Extraocular movements intact bilaterally.       Assessment / Plan      Assessment/Plan:   Diagnoses and all orders for this visit:    1. Spells of decreased " attentiveness (Primary)    2. Paresthesia    3. History of ETOH abuse         Follow Up:   Return if symptoms worsen or fail to improve.    Anticipatory Guidance and Safety Reviewed  Patient Education Provided  Reviewed MRI and EEG and discussed  Declined offer of referral to Behavioral Health for ETOH; discussed pharmacological intervention for ETOH cravings and he would like to hold at this time  Encouraged ETOH cessation and avoidance    Seizure precautions reviewed: need to avoid driving 90 days following seizure activity, need to avoid working in positions of great physical height, need to call 911/go to ER with seizure activity > 4 minutes, and to avoid unsupervised hot tubs and baths  Declined offer of referral to board certified Epileptologist for EMU     RTC PRN or within return of symptoms     Joleen Guzman, DNP, APRN, FNP-C  [facility]